# Patient Record
Sex: MALE | Race: WHITE | NOT HISPANIC OR LATINO | Employment: FULL TIME | URBAN - METROPOLITAN AREA
[De-identification: names, ages, dates, MRNs, and addresses within clinical notes are randomized per-mention and may not be internally consistent; named-entity substitution may affect disease eponyms.]

---

## 2017-03-24 ENCOUNTER — RECORDS - HEALTHEAST (OUTPATIENT)
Dept: LAB | Facility: CLINIC | Age: 27
End: 2017-03-24

## 2017-03-24 LAB
CHOLEST SERPL-MCNC: 121 MG/DL
FASTING STATUS PATIENT QL REPORTED: YES
HDLC SERPL-MCNC: 29 MG/DL
LDLC SERPL CALC-MCNC: 78 MG/DL
TRIGL SERPL-MCNC: 69 MG/DL

## 2018-02-19 ENCOUNTER — RECORDS - HEALTHEAST (OUTPATIENT)
Dept: LAB | Facility: CLINIC | Age: 28
End: 2018-02-19

## 2018-02-19 LAB
ALBUMIN SERPL-MCNC: 3.9 G/DL (ref 3.5–5)
ALP SERPL-CCNC: 88 U/L (ref 45–120)
ALT SERPL W P-5'-P-CCNC: 37 U/L (ref 0–45)
ANION GAP SERPL CALCULATED.3IONS-SCNC: 9 MMOL/L (ref 5–18)
AST SERPL W P-5'-P-CCNC: 21 U/L (ref 0–40)
BILIRUB SERPL-MCNC: 0.9 MG/DL (ref 0–1)
BUN SERPL-MCNC: 18 MG/DL (ref 8–22)
CALCIUM SERPL-MCNC: 9.2 MG/DL (ref 8.5–10.5)
CHLORIDE BLD-SCNC: 102 MMOL/L (ref 98–107)
CHOLEST SERPL-MCNC: 149 MG/DL
CO2 SERPL-SCNC: 27 MMOL/L (ref 22–31)
CREAT SERPL-MCNC: 0.88 MG/DL (ref 0.7–1.3)
FASTING STATUS PATIENT QL REPORTED: YES
GFR SERPL CREATININE-BSD FRML MDRD: >60 ML/MIN/1.73M2
GLUCOSE BLD-MCNC: 301 MG/DL (ref 70–125)
HDLC SERPL-MCNC: 28 MG/DL
LDLC SERPL CALC-MCNC: 95 MG/DL
POTASSIUM BLD-SCNC: 4.6 MMOL/L (ref 3.5–5)
PROT SERPL-MCNC: 6.7 G/DL (ref 6–8)
SODIUM SERPL-SCNC: 138 MMOL/L (ref 136–145)
TRIGL SERPL-MCNC: 130 MG/DL

## 2018-05-29 ENCOUNTER — RECORDS - HEALTHEAST (OUTPATIENT)
Dept: LAB | Facility: CLINIC | Age: 28
End: 2018-05-29

## 2018-05-29 LAB
ALBUMIN SERPL-MCNC: 3.9 G/DL (ref 3.5–5)
ALP SERPL-CCNC: 82 U/L (ref 45–120)
ALT SERPL W P-5'-P-CCNC: 32 U/L (ref 0–45)
ANION GAP SERPL CALCULATED.3IONS-SCNC: 10 MMOL/L (ref 5–18)
AST SERPL W P-5'-P-CCNC: 16 U/L (ref 0–40)
BILIRUB SERPL-MCNC: 0.6 MG/DL (ref 0–1)
BUN SERPL-MCNC: 13 MG/DL (ref 8–22)
CALCIUM SERPL-MCNC: 9.8 MG/DL (ref 8.5–10.5)
CHLORIDE BLD-SCNC: 105 MMOL/L (ref 98–107)
CHOLEST SERPL-MCNC: 144 MG/DL
CO2 SERPL-SCNC: 25 MMOL/L (ref 22–31)
CREAT SERPL-MCNC: 0.79 MG/DL (ref 0.7–1.3)
FASTING STATUS PATIENT QL REPORTED: YES
GFR SERPL CREATININE-BSD FRML MDRD: >60 ML/MIN/1.73M2
GLUCOSE BLD-MCNC: 197 MG/DL (ref 70–125)
HDLC SERPL-MCNC: 34 MG/DL
LDLC SERPL CALC-MCNC: 88 MG/DL
POTASSIUM BLD-SCNC: 4.5 MMOL/L (ref 3.5–5)
PROT SERPL-MCNC: 6.7 G/DL (ref 6–8)
SODIUM SERPL-SCNC: 140 MMOL/L (ref 136–145)
TRIGL SERPL-MCNC: 112 MG/DL

## 2019-03-05 ENCOUNTER — RECORDS - HEALTHEAST (OUTPATIENT)
Dept: LAB | Facility: CLINIC | Age: 29
End: 2019-03-05

## 2019-03-05 LAB
ALBUMIN SERPL-MCNC: 3.8 G/DL (ref 3.5–5)
ALP SERPL-CCNC: 81 U/L (ref 45–120)
ALT SERPL W P-5'-P-CCNC: 68 U/L (ref 0–45)
ANION GAP SERPL CALCULATED.3IONS-SCNC: 6 MMOL/L (ref 5–18)
AST SERPL W P-5'-P-CCNC: 41 U/L (ref 0–40)
BILIRUB SERPL-MCNC: 0.9 MG/DL (ref 0–1)
BUN SERPL-MCNC: 15 MG/DL (ref 8–22)
CALCIUM SERPL-MCNC: 9.5 MG/DL (ref 8.5–10.5)
CHLORIDE BLD-SCNC: 102 MMOL/L (ref 98–107)
CHOLEST SERPL-MCNC: 125 MG/DL
CO2 SERPL-SCNC: 29 MMOL/L (ref 22–31)
CREAT SERPL-MCNC: 0.91 MG/DL (ref 0.7–1.3)
FASTING STATUS PATIENT QL REPORTED: ABNORMAL
GFR SERPL CREATININE-BSD FRML MDRD: >60 ML/MIN/1.73M2
GLUCOSE BLD-MCNC: 357 MG/DL (ref 70–125)
HDLC SERPL-MCNC: 32 MG/DL
LDLC SERPL CALC-MCNC: 62 MG/DL
POTASSIUM BLD-SCNC: 4.6 MMOL/L (ref 3.5–5)
PROT SERPL-MCNC: 6.5 G/DL (ref 6–8)
SODIUM SERPL-SCNC: 137 MMOL/L (ref 136–145)
TRIGL SERPL-MCNC: 155 MG/DL

## 2019-10-21 ENCOUNTER — RECORDS - HEALTHEAST (OUTPATIENT)
Dept: LAB | Facility: CLINIC | Age: 29
End: 2019-10-21

## 2019-10-21 LAB
ALBUMIN SERPL-MCNC: 4.1 G/DL (ref 3.5–5)
ALP SERPL-CCNC: 74 U/L (ref 45–120)
ALT SERPL W P-5'-P-CCNC: 47 U/L (ref 0–45)
ANION GAP SERPL CALCULATED.3IONS-SCNC: 11 MMOL/L (ref 5–18)
AST SERPL W P-5'-P-CCNC: 20 U/L (ref 0–40)
BILIRUB SERPL-MCNC: 0.8 MG/DL (ref 0–1)
BUN SERPL-MCNC: 11 MG/DL (ref 8–22)
CALCIUM SERPL-MCNC: 9.8 MG/DL (ref 8.5–10.5)
CHLORIDE BLD-SCNC: 102 MMOL/L (ref 98–107)
CHOLEST SERPL-MCNC: 132 MG/DL
CO2 SERPL-SCNC: 26 MMOL/L (ref 22–31)
CREAT SERPL-MCNC: 0.81 MG/DL (ref 0.7–1.3)
FASTING STATUS PATIENT QL REPORTED: YES
GFR SERPL CREATININE-BSD FRML MDRD: >60 ML/MIN/1.73M2
GLUCOSE BLD-MCNC: 242 MG/DL (ref 70–125)
HDLC SERPL-MCNC: 33 MG/DL
LDLC SERPL CALC-MCNC: 72 MG/DL
POTASSIUM BLD-SCNC: 4.3 MMOL/L (ref 3.5–5)
PROT SERPL-MCNC: 6.7 G/DL (ref 6–8)
SODIUM SERPL-SCNC: 139 MMOL/L (ref 136–145)
TRIGL SERPL-MCNC: 136 MG/DL

## 2020-04-10 ENCOUNTER — RECORDS - HEALTHEAST (OUTPATIENT)
Dept: LAB | Facility: CLINIC | Age: 30
End: 2020-04-10

## 2020-04-10 LAB
ALBUMIN SERPL-MCNC: 4.1 G/DL (ref 3.5–5)
ALP SERPL-CCNC: 84 U/L (ref 45–120)
ALT SERPL W P-5'-P-CCNC: 37 U/L (ref 0–45)
ANION GAP SERPL CALCULATED.3IONS-SCNC: 9 MMOL/L (ref 5–18)
AST SERPL W P-5'-P-CCNC: 19 U/L (ref 0–40)
BILIRUB SERPL-MCNC: 0.4 MG/DL (ref 0–1)
BUN SERPL-MCNC: 12 MG/DL (ref 8–22)
CALCIUM SERPL-MCNC: 9.5 MG/DL (ref 8.5–10.5)
CHLORIDE BLD-SCNC: 102 MMOL/L (ref 98–107)
CO2 SERPL-SCNC: 27 MMOL/L (ref 22–31)
CREAT SERPL-MCNC: 0.88 MG/DL (ref 0.7–1.3)
GFR SERPL CREATININE-BSD FRML MDRD: >60 ML/MIN/1.73M2
GLUCOSE BLD-MCNC: 191 MG/DL (ref 70–125)
POTASSIUM BLD-SCNC: 4 MMOL/L (ref 3.5–5)
PROT SERPL-MCNC: 7.1 G/DL (ref 6–8)
SODIUM SERPL-SCNC: 138 MMOL/L (ref 136–145)

## 2020-11-16 ENCOUNTER — RECORDS - HEALTHEAST (OUTPATIENT)
Dept: LAB | Facility: CLINIC | Age: 30
End: 2020-11-16

## 2020-11-16 LAB
ALBUMIN SERPL-MCNC: 4.2 G/DL (ref 3.5–5)
ALP SERPL-CCNC: 84 U/L (ref 45–120)
ALT SERPL W P-5'-P-CCNC: 35 U/L (ref 0–45)
ANION GAP SERPL CALCULATED.3IONS-SCNC: 9 MMOL/L (ref 5–18)
AST SERPL W P-5'-P-CCNC: 26 U/L (ref 0–40)
BILIRUB SERPL-MCNC: 0.7 MG/DL (ref 0–1)
BUN SERPL-MCNC: 15 MG/DL (ref 8–22)
CALCIUM SERPL-MCNC: 9.1 MG/DL (ref 8.5–10.5)
CHLORIDE BLD-SCNC: 104 MMOL/L (ref 98–107)
CHOLEST SERPL-MCNC: 136 MG/DL
CO2 SERPL-SCNC: 25 MMOL/L (ref 22–31)
CREAT SERPL-MCNC: 0.88 MG/DL (ref 0.7–1.3)
CREAT UR-MCNC: 34.8 MG/DL
FASTING STATUS PATIENT QL REPORTED: NO
GFR SERPL CREATININE-BSD FRML MDRD: >60 ML/MIN/1.73M2
GLUCOSE BLD-MCNC: 410 MG/DL (ref 70–125)
HDLC SERPL-MCNC: 33 MG/DL
LDLC SERPL CALC-MCNC: 85 MG/DL
MICROALBUMIN UR-MCNC: <0.5 MG/DL (ref 0–1.99)
MICROALBUMIN/CREAT UR: NORMAL MG/G{CREAT}
POTASSIUM BLD-SCNC: 4.5 MMOL/L (ref 3.5–5)
PROT SERPL-MCNC: 6.7 G/DL (ref 6–8)
SODIUM SERPL-SCNC: 138 MMOL/L (ref 136–145)
TRIGL SERPL-MCNC: 89 MG/DL

## 2021-05-19 ENCOUNTER — AMBULATORY - HEALTHEAST (OUTPATIENT)
Dept: LAB | Facility: HOSPITAL | Age: 31
End: 2021-05-19

## 2021-05-19 ENCOUNTER — RECORDS - HEALTHEAST (OUTPATIENT)
Dept: ADMINISTRATIVE | Facility: OTHER | Age: 31
End: 2021-05-19

## 2021-05-19 DIAGNOSIS — F90.9 ATTENTION DEFICIT HYPERACTIVITY DISORDER: ICD-10-CM

## 2021-05-21 LAB
MISCELLANEOUS TEST DEPT. - HE HISTORICAL: NORMAL
PERFORMING LAB: NORMAL
SPECIMEN STATUS: NORMAL
TEST NAME: NORMAL

## 2021-07-26 ENCOUNTER — OFFICE VISIT (OUTPATIENT)
Dept: FAMILY MEDICINE | Facility: CLINIC | Age: 31
End: 2021-07-26
Payer: COMMERCIAL

## 2021-07-26 VITALS
SYSTOLIC BLOOD PRESSURE: 116 MMHG | HEART RATE: 82 BPM | TEMPERATURE: 96.9 F | HEIGHT: 72 IN | BODY MASS INDEX: 36.85 KG/M2 | DIASTOLIC BLOOD PRESSURE: 74 MMHG | WEIGHT: 272.1 LBS | OXYGEN SATURATION: 97 %

## 2021-07-26 DIAGNOSIS — F41.1 GAD (GENERALIZED ANXIETY DISORDER): ICD-10-CM

## 2021-07-26 DIAGNOSIS — Z86.59 HISTORY OF ATTENTION DEFICIT HYPERACTIVITY DISORDER (ADHD): Primary | ICD-10-CM

## 2021-07-26 PROBLEM — F90.9 ATTENTION DEFICIT HYPERACTIVITY DISORDER: Status: ACTIVE | Noted: 2021-07-26

## 2021-07-26 PROBLEM — B94.8 SEQUELAE OF OTHER SPECIFIED INFECTIOUS AND PARASITIC DISEASES: Status: ACTIVE | Noted: 2021-07-26

## 2021-07-26 PROBLEM — R43.2 LOSS OF TASTE: Status: ACTIVE | Noted: 2021-07-26

## 2021-07-26 PROBLEM — R43.0 ANOSMIA: Status: ACTIVE | Noted: 2021-07-26

## 2021-07-26 PROBLEM — E78.5 HYPERLIPIDEMIA: Status: ACTIVE | Noted: 2021-07-26

## 2021-07-26 PROBLEM — K21.9 GASTROESOPHAGEAL REFLUX DISEASE: Status: ACTIVE | Noted: 2021-07-26

## 2021-07-26 PROBLEM — J30.9 ALLERGIC RHINITIS: Status: ACTIVE | Noted: 2021-07-26

## 2021-07-26 PROBLEM — E11.9 DIABETES MELLITUS (H): Status: ACTIVE | Noted: 2021-07-26

## 2021-07-26 PROBLEM — E66.812 CLASS 2 OBESITY: Status: ACTIVE | Noted: 2021-07-26

## 2021-07-26 PROBLEM — J45.30 MILD PERSISTENT ASTHMA WITHOUT COMPLICATION: Status: ACTIVE | Noted: 2021-07-26

## 2021-07-26 PROCEDURE — 99204 OFFICE O/P NEW MOD 45 MIN: CPT | Performed by: FAMILY MEDICINE

## 2021-07-26 RX ORDER — GLIPIZIDE 5 MG/1
1 TABLET, FILM COATED, EXTENDED RELEASE ORAL DAILY
COMMUNITY
Start: 2021-07-19 | End: 2021-09-14

## 2021-07-26 RX ORDER — FLUOXETINE 10 MG/1
10 CAPSULE ORAL DAILY
Qty: 30 CAPSULE | Refills: 1 | Status: SHIPPED | OUTPATIENT
Start: 2021-07-26 | End: 2021-11-08

## 2021-07-26 RX ORDER — ALBUTEROL SULFATE 90 UG/1
2 POWDER, METERED RESPIRATORY (INHALATION) PRN
COMMUNITY
Start: 2021-04-19

## 2021-07-26 RX ORDER — EMPAGLIFLOZIN 25 MG/1
1 TABLET, FILM COATED ORAL DAILY
COMMUNITY
Start: 2021-06-13

## 2021-07-26 RX ORDER — SIMVASTATIN 20 MG
1 TABLET ORAL DAILY
COMMUNITY
Start: 2021-07-01

## 2021-07-26 RX ORDER — BUPROPION HYDROCHLORIDE 150 MG/1
150 TABLET ORAL EVERY MORNING
Qty: 30 TABLET | Refills: 1
Start: 2021-07-26

## 2021-07-26 RX ORDER — BUPROPION HYDROCHLORIDE 150 MG/1
300 TABLET ORAL DAILY
COMMUNITY
Start: 2021-06-13 | End: 2021-07-26

## 2021-07-26 ASSESSMENT — ANXIETY QUESTIONNAIRES
6. BECOMING EASILY ANNOYED OR IRRITABLE: MORE THAN HALF THE DAYS
3. WORRYING TOO MUCH ABOUT DIFFERENT THINGS: MORE THAN HALF THE DAYS
IF YOU CHECKED OFF ANY PROBLEMS ON THIS QUESTIONNAIRE, HOW DIFFICULT HAVE THESE PROBLEMS MADE IT FOR YOU TO DO YOUR WORK, TAKE CARE OF THINGS AT HOME, OR GET ALONG WITH OTHER PEOPLE: SOMEWHAT DIFFICULT
7. FEELING AFRAID AS IF SOMETHING AWFUL MIGHT HAPPEN: MORE THAN HALF THE DAYS
2. NOT BEING ABLE TO STOP OR CONTROL WORRYING: MORE THAN HALF THE DAYS
GAD7 TOTAL SCORE: 12
1. FEELING NERVOUS, ANXIOUS, OR ON EDGE: MORE THAN HALF THE DAYS
5. BEING SO RESTLESS THAT IT IS HARD TO SIT STILL: SEVERAL DAYS

## 2021-07-26 ASSESSMENT — PATIENT HEALTH QUESTIONNAIRE - PHQ9
SUM OF ALL RESPONSES TO PHQ QUESTIONS 1-9: 9
5. POOR APPETITE OR OVEREATING: SEVERAL DAYS

## 2021-07-26 ASSESSMENT — MIFFLIN-ST. JEOR: SCORE: 2242.24

## 2021-07-26 NOTE — PROGRESS NOTES
Assessment & Plan     History of attention deficit hyperactivity disorder (ADHD)  Patient has a history of ADHD as a child.  Complicating factors include a fair amount of anxiety and some relationship stressors in addition to a learning disability per patient.  Recommended formal evaluation with a psychologist to attempt to decipher what portion of his symptoms are related to ongoing ADHD.  Referral placed today.  - buPROPion (WELLBUTRIN XL) 150 MG 24 hr tablet; Take 1 tablet (150 mg) by mouth every morning  - MENTAL HEALTH REFERRAL  - Adult; Assessments and Testing; ADHD; [facility]: [facility] [phone]; We will contact you to schedule the appointment or please call with any questions; Future    MAREN (generalized anxiety disorder)  Patient appears to have a significant amount of anxiety, not a lot of depression.  He has never tried an SSRI for his symptoms.  Recommended tapering off Wellbutrin and onto an SSRI, will start with a low-dose of fluoxetine.  Plan recheck mood in 1 month.  - FLUoxetine (PROZAC) 10 MG capsule; Take 1 capsule (10 mg) by mouth daily  - MENTAL HEALTH REFERRAL  - Adult; Assessments and Testing; ADHD; G: [facility] [phone]; We will contact you to schedule the appointment or please call with any questions; Future    Prescription drug management  45 minutes spent on the date of the encounter doing chart review, history and exam, documentation and further activities per the note       BMI:   Estimated body mass index is 36.9 kg/m  as calculated from the following:    Height as of this encounter: 1.829 m (6').    Weight as of this encounter: 123.4 kg (272 lb 1.6 oz).       Return in about 4 weeks (around 8/23/2021) for Follow up mood and ADHD.    Miranda Cohcran  [facility] SYLVAIN Perez is a 30 year old who presents for the following health issues     HPI     Patient presents today primarily to discuss ADHD.  He  states that he was initially diagnosed at age 7 as a child through a formal assessment with a psychologist.  He was diagnosed with hyperactive ADHD, possibly also inattentive, but he is unsure about this.  He was treated with medication all the way through high school but felt that he did not need the medication anymore after graduating high school.  He works as an  for a city [employer] company.  His work hours are 4 PM to 12:30 AM.  He goes to bed between 2 and 3:00 in the morning, waking up between 11 AM and 12 PM.  He gives himself 8 hours to sleep.  The most recent medication that he took in high school was Concerta.  He thinks he took over 100 mg of Concerta along with another short acting medication as needed.  His current symptoms include putting off tasks for the last minute, trouble staying focused and on task.  He has noticed more bothersome symptoms over the past year.  His MAREN-7 score today is 12 with most prominent symptoms including excessive worry.  His PHQ-9 score is 7 with most prominent symptoms including trouble focusing and feeling jittery.  Patient also mentions that he is looking for a new primary care provider as his previous primary has left [facility], his previous clinic.  He states he was recently referred to a therapist for anxiety and has had one intake appointment.  This is through [facility].  He was told that this therapist does not do ADHD evaluations.    Review of Systems   Constitutional, HEENT, cardiovascular, pulmonary, gi and gu systems are negative, except as otherwise noted.      Objective    /74 (BP Location: Right arm, Patient Position: Sitting, Cuff Size: Adult Large)   Pulse 82   Temp 96.9  F (36.1  C) (Tympanic)   Ht 1.829 m (6')   Wt 123.4 kg (272 lb 1.6 oz)   SpO2 97%   BMI 36.90 kg/m    Body mass index is 36.9 kg/m .  Physical Exam   GENERAL: healthy, alert and no distress  RESP: Breathing is unlabored  CV: regular  rate and rhythm, normal S1 S2, no S3 or S4, no murmur, click or rub, no peripheral edema  MS: no gross musculoskeletal defects noted, no edema  NEURO: Normal strength and tone, mentation intact and speech normal  PSYCH: mentation appears normal, affect intense, mood described as anxious

## 2021-07-27 ASSESSMENT — ASTHMA QUESTIONNAIRES: ACT_TOTALSCORE: 25

## 2021-07-27 ASSESSMENT — ANXIETY QUESTIONNAIRES: GAD7 TOTAL SCORE: 12

## 2021-08-19 DIAGNOSIS — F41.1 GAD (GENERALIZED ANXIETY DISORDER): ICD-10-CM

## 2021-08-22 ENCOUNTER — HEALTH MAINTENANCE LETTER (OUTPATIENT)
Age: 31
End: 2021-08-22

## 2021-08-23 RX ORDER — FLUOXETINE 10 MG/1
CAPSULE ORAL
Qty: 30 CAPSULE | Refills: 1 | OUTPATIENT
Start: 2021-08-23

## 2021-08-30 ENCOUNTER — OFFICE VISIT (OUTPATIENT)
Dept: FAMILY MEDICINE | Facility: CLINIC | Age: 31
End: 2021-08-30
Payer: COMMERCIAL

## 2021-08-30 VITALS
WEIGHT: 270.38 LBS | BODY MASS INDEX: 36.67 KG/M2 | SYSTOLIC BLOOD PRESSURE: 124 MMHG | OXYGEN SATURATION: 97 % | DIASTOLIC BLOOD PRESSURE: 84 MMHG | HEART RATE: 81 BPM

## 2021-08-30 DIAGNOSIS — F41.1 GAD (GENERALIZED ANXIETY DISORDER): Primary | ICD-10-CM

## 2021-08-30 DIAGNOSIS — E66.01 MORBID OBESITY (H): ICD-10-CM

## 2021-08-30 DIAGNOSIS — E11.65 TYPE 2 DIABETES MELLITUS WITH HYPERGLYCEMIA, WITHOUT LONG-TERM CURRENT USE OF INSULIN (H): ICD-10-CM

## 2021-08-30 DIAGNOSIS — E78.5 HYPERLIPIDEMIA, UNSPECIFIED HYPERLIPIDEMIA TYPE: ICD-10-CM

## 2021-08-30 PROBLEM — E66.812 CLASS 2 OBESITY: Status: RESOLVED | Noted: 2021-07-26 | Resolved: 2021-08-30

## 2021-08-30 LAB
ALBUMIN SERPL-MCNC: 4 G/DL (ref 3.5–5)
ALP SERPL-CCNC: 77 U/L (ref 45–120)
ALT SERPL W P-5'-P-CCNC: 18 U/L (ref 0–45)
ANION GAP SERPL CALCULATED.3IONS-SCNC: 11 MMOL/L (ref 5–18)
AST SERPL W P-5'-P-CCNC: 13 U/L (ref 0–40)
BILIRUB SERPL-MCNC: 0.7 MG/DL (ref 0–1)
BUN SERPL-MCNC: 10 MG/DL (ref 8–22)
CALCIUM SERPL-MCNC: 9.9 MG/DL (ref 8.5–10.5)
CHLORIDE BLD-SCNC: 105 MMOL/L (ref 98–107)
CHOLEST SERPL-MCNC: 147 MG/DL
CO2 SERPL-SCNC: 25 MMOL/L (ref 22–31)
CREAT SERPL-MCNC: 0.78 MG/DL (ref 0.7–1.3)
FASTING STATUS PATIENT QL REPORTED: YES
GFR SERPL CREATININE-BSD FRML MDRD: >90 ML/MIN/1.73M2
GLUCOSE BLD-MCNC: 223 MG/DL (ref 70–125)
HBA1C MFR BLD: 10.1 % (ref 0–5.6)
HDLC SERPL-MCNC: 37 MG/DL
LDLC SERPL CALC-MCNC: 88 MG/DL
POTASSIUM BLD-SCNC: 4.5 MMOL/L (ref 3.5–5)
PROT SERPL-MCNC: 7.3 G/DL (ref 6–8)
SODIUM SERPL-SCNC: 141 MMOL/L (ref 136–145)
TRIGL SERPL-MCNC: 108 MG/DL
TSH SERPL DL<=0.005 MIU/L-ACNC: 0.77 UIU/ML (ref 0.3–5)

## 2021-08-30 PROCEDURE — 83036 HEMOGLOBIN GLYCOSYLATED A1C: CPT | Performed by: FAMILY MEDICINE

## 2021-08-30 PROCEDURE — 84443 ASSAY THYROID STIM HORMONE: CPT | Performed by: FAMILY MEDICINE

## 2021-08-30 PROCEDURE — 80061 LIPID PANEL: CPT | Performed by: FAMILY MEDICINE

## 2021-08-30 PROCEDURE — 36415 COLL VENOUS BLD VENIPUNCTURE: CPT | Performed by: FAMILY MEDICINE

## 2021-08-30 PROCEDURE — 80053 COMPREHEN METABOLIC PANEL: CPT | Performed by: FAMILY MEDICINE

## 2021-08-30 PROCEDURE — 99214 OFFICE O/P EST MOD 30 MIN: CPT | Performed by: FAMILY MEDICINE

## 2021-08-30 NOTE — PROGRESS NOTES
Assessment & Plan     MAREN (generalized anxiety disorder)  Labs updated as below and normal.  Patient continues to follow with a therapist and has a relationship with a psychiatrist as well.  His MAREN-7 score remains elevated, not currently taking Wellbutrin.  Asked him to discuss this with his psychiatric provider.  He has an upcoming appointment.  - TSH with free T4 reflex; Future  - Comprehensive metabolic panel (BMP + Alb, Alk Phos, ALT, AST, Total. Bili, TP); Future    Morbid obesity (H)  Labs updated as below and normal with the exception of greatly elevated blood sugars.  Comorbidities include type 2 diabetes and hyperlipidemia.  Discussed a healthy, vegetable rich diet and regular exercise.  Patient seems contemplative about lifestyle changes.  Restrictions include his work schedule.  - TSH with free T4 reflex; Future  - Lipid panel reflex to direct LDL Fasting; Future  - Comprehensive metabolic panel (BMP + Alb, Alk Phos, ALT, AST, Total. Bili, TP); Future    Type 2 diabetes mellitus without complication, without long-term current use of insulin (H)  A1c is greatly elevated, as is blood sugar.  He does not bring any blood sugar readings for review today.  I asked him to check morning fasting sugars and occasional 2-hour postmeal sugars and bring these to his next visit in 4 weeks.  Initially I would probably change him to a GLP-1 and drop his glipizide.  Ultimately he will probably need long-acting insulin as well.  - Hemoglobin A1c; Future  - TSH with free T4 reflex; Future  - Lipid panel reflex to direct LDL Fasting; Future  - Comprehensive metabolic panel (BMP + Alb, Alk Phos, ALT, AST, Total. Bili, TP); Future    Hyperlipidemia, unspecified hyperlipidemia type  Continues on simvastatin and tolerates this well.  Lipid profile within goal range.  - TSH with free T4 reflex; Future  - Lipid panel reflex to direct LDL Fasting; Future  - Comprehensive metabolic panel (BMP + Alb, Alk Phos, ALT, AST, Total.  ILAN Olvera); Future    Ordering of each unique test  Prescription drug management  32 minutes spent on the date of the encounter doing chart review, history and exam, documentation and further activities per the note       BMI:   Estimated body mass index is 36.67 kg/m  as calculated from the following:    Height as of 7/26/21: 1.829 m (6').    Weight as of this encounter: 122.6 kg (270 lb 6 oz).   Weight management plan: Discussed healthy diet and exercise guidelines        Return in about 4 weeks (around 9/27/2021) for Routine preventive.    Miranda Cochran MD  [facility]    Miri Perez is a 30 year old who presents for the following health issues     HPI     Patient presents today primarily in follow-up of anxiety and depression.  At his last visit, I got the impression that he wanted to change his primary care and also be tested for ADHD.  At that time, we adjusted some of his psychiatric medications and set him up for a formal assessment for ADHD.  He actually states that he has a therapist and a psychiatrist through [facility].  He claims that his psychiatrist asked him to come here for formal assessment for ADHD.  He has stopped taking his Wellbutrin for unclear reasons.  He states he has an appointment with his previous primary care provider next week for updated labs.  Try to clear some of his confusion for the patient today, he continues to express desire to change his primary care, so discussed that I can order his routine labs today.  I can also take over managing his diabetes medications.  He verbalizes understanding and will cancel his Entira visit.  Also discussed that to minimize confusion, we can follow through with a formal ADHD assessment, but I should not be managing his psychiatric medications if he has another psychiatric prescriber.  In terms of his diabetes, he does not bring a blood sugar readings to the visit today.  He notes that these are  quite high at home.  He thinks his diet is not as healthy as it should be.  He works an evening shift.  He has not been getting regular purposeful exercise.  He thinks it has been over 1 year since he met with diabetic educator.    Review of Systems   Constitutional, HEENT, cardiovascular, pulmonary, gi and gu systems are negative, except as otherwise noted.      Objective    /84 (BP Location: Right arm, Patient Position: Sitting, Cuff Size: Adult Regular)   Pulse 81   Wt 122.6 kg (270 lb 6 oz)   SpO2 97%   BMI 36.67 kg/m    Body mass index is 36.67 kg/m .  Physical Exam   GENERAL: healthy, alert and no distress  NECK: no adenopathy, no asymmetry, masses, or scars and thyroid normal to palpation  RESP: lungs clear to auscultation - no rales, rhonchi or wheezes  CV: regular rate and rhythm, normal S1 S2, no S3 or S4, no murmur, click or rub, no peripheral edema and peripheral pulses strong  ABDOMEN: soft, nontender, no hepatosplenomegaly, no masses and bowel sounds normal  MS: no gross musculoskeletal defects noted, no edema  NEURO: Normal strength and tone, mentation intact and speech normal  PSYCH: mentation appears normal, affect anxious    Results for orders placed or performed in visit on 08/30/21   Hemoglobin A1c     Status: Abnormal   Result Value Ref Range    Hemoglobin A1C 10.1 (H) 0.0 - 5.6 %   TSH with free T4 reflex     Status: Normal   Result Value Ref Range    TSH 0.77 0.30 - 5.00 uIU/mL   Lipid panel reflex to direct LDL Fasting     Status: Abnormal   Result Value Ref Range    Cholesterol 147 <=199 mg/dL    Triglycerides 108 <=149 mg/dL    Direct Measure HDL 37 (L) >=40 mg/dL    LDL Cholesterol Calculated 88 <=129 mg/dL    Patient Fasting > 8hrs? Yes    Comprehensive metabolic panel (BMP + Alb, Alk Phos, ALT, AST, Total. Bili, TP)     Status: Abnormal   Result Value Ref Range    Sodium 141 136 - 145 mmol/L    Potassium 4.5 3.5 - 5.0 mmol/L    Chloride 105 98 - 107 mmol/L    Carbon Dioxide  (CO2) 25 22 - 31 mmol/L    Anion Gap 11 5 - 18 mmol/L    Urea Nitrogen 10 8 - 22 mg/dL    Creatinine 0.78 0.70 - 1.30 mg/dL    Calcium 9.9 8.5 - 10.5 mg/dL    Glucose 223 (H) 70 - 125 mg/dL    Alkaline Phosphatase 77 45 - 120 U/L    AST 13 0 - 40 U/L    ALT 18 0 - 45 U/L    Protein Total 7.3 6.0 - 8.0 g/dL    Albumin 4.0 3.5 - 5.0 g/dL    Bilirubin Total 0.7 0.0 - 1.0 mg/dL    GFR Estimate >90 >60 mL/min/1.73m2

## 2021-08-30 NOTE — PATIENT INSTRUCTIONS
I would keep your connection with your psychiatrist.  Make a follow-up appointment if you do not already have one.    Keep the appointment for formal ADHD testing.    We will update diabetes labs today, cancel your upcoming appointment with Dante.  Let's see you back in about 1 month for a complete physical exam.

## 2021-09-01 ASSESSMENT — PATIENT HEALTH QUESTIONNAIRE - PHQ9
SUM OF ALL RESPONSES TO PHQ QUESTIONS 1-9: 4
5. POOR APPETITE OR OVEREATING: SEVERAL DAYS

## 2021-09-01 ASSESSMENT — ANXIETY QUESTIONNAIRES
5. BEING SO RESTLESS THAT IT IS HARD TO SIT STILL: SEVERAL DAYS
2. NOT BEING ABLE TO STOP OR CONTROL WORRYING: MORE THAN HALF THE DAYS
IF YOU CHECKED OFF ANY PROBLEMS ON THIS QUESTIONNAIRE, HOW DIFFICULT HAVE THESE PROBLEMS MADE IT FOR YOU TO DO YOUR WORK, TAKE CARE OF THINGS AT HOME, OR GET ALONG WITH OTHER PEOPLE: SOMEWHAT DIFFICULT
3. WORRYING TOO MUCH ABOUT DIFFERENT THINGS: MORE THAN HALF THE DAYS
1. FEELING NERVOUS, ANXIOUS, OR ON EDGE: SEVERAL DAYS
GAD7 TOTAL SCORE: 10
7. FEELING AFRAID AS IF SOMETHING AWFUL MIGHT HAPPEN: SEVERAL DAYS
6. BECOMING EASILY ANNOYED OR IRRITABLE: MORE THAN HALF THE DAYS

## 2021-09-02 ASSESSMENT — ANXIETY QUESTIONNAIRES: GAD7 TOTAL SCORE: 10

## 2021-09-14 ENCOUNTER — OFFICE VISIT (OUTPATIENT)
Dept: EDUCATION SERVICES | Facility: CLINIC | Age: 31
End: 2021-09-14
Attending: FAMILY MEDICINE
Payer: COMMERCIAL

## 2021-09-14 VITALS — WEIGHT: 279.1 LBS | BODY MASS INDEX: 37.85 KG/M2

## 2021-09-14 DIAGNOSIS — E11.65 TYPE 2 DIABETES MELLITUS WITH HYPERGLYCEMIA, WITHOUT LONG-TERM CURRENT USE OF INSULIN (H): ICD-10-CM

## 2021-09-14 PROCEDURE — G0108 DIAB MANAGE TRN  PER INDIV: HCPCS | Mod: AE

## 2021-09-14 RX ORDER — GLIPIZIDE 5 MG/1
10 TABLET, FILM COATED, EXTENDED RELEASE ORAL DAILY
Qty: 180 TABLET | Refills: 1 | Status: SHIPPED | OUTPATIENT
Start: 2021-09-14

## 2021-09-14 NOTE — LETTER
9/14/2021         RE: Chris Astorga  [address]  [place] [address]        Dear Colleague,    Thank you for referring your patient, Chris Astorga, to the [facility]. Please see a copy of my visit note below.    Diabetes Self-Management Education & Support    Presents for: Individual review    Type of Visit: In Person    How would patient like to obtain AVS?  AVS was provided to patient at conclusion of today's visit.    ASSESSMENT:    Chris (Alberto) is a 31-year-old who presents today for consult regarding his diabetes management and self diabetes management skills with an A1c not at ADA goal of less than 7%.  He arrived today unaccompanied and did have his meter with him.  Per review of his blood glucose he is sometimes checking his blood glucose twice daily but this is not consistent.  He is currently on 3 medications for his diabetes management and per his report he may forget to take them or skipped doses 2-3 times each week.  This is because he simply forgets or does not have his pillbox available to him.  Alberto was originally diagnosed with diabetes in 2017 at which time he attended diabetes education classes through the [facility].  He met with the educator again in 2018 but has not followed up since.  He is currently employed as an  in his hours at 4 PM to 12:30 AM.    Patient's most recent   Lab Results   Component Value Date    A1C 10.1 08/30/2021    is not meeting goal of <7.0    Diabetes knowledge and skills assessment:   Patient is knowledgeable in diabetes management concepts related to: None    Continue education with the following diabetes management concepts: Healthy Eating, Being Active, Monitoring, Taking Medication, Problem Solving, Reducing Risks and Healthy Coping    Based on learning assessment above, most appropriate setting for further diabetes education would be: Individual setting.    INTERVENTIONS:    Education  provided today on:  AADE Self-Care Behaviors:  Diabetes Pathophysiology  Healthy Eating: consistency in amount, composition, and timing of food intake, weight reduction, eating out and portion control  Being Active: relationship to blood glucose and describe appropriate activity program  Monitoring: purpose, individual blood glucose targets, frequency of monitoring and use of CGM.  Chris expressed a great deal of interest in personal CGM's today-he was ultimately provided with a sample kory 2 and reader to use for the next 14 days.  I am hoping that in using this and being able to see real-time blood glucose in relation to food intake and activity may be the motivating factor to getting him engaged with his management  Taking Medication: action of prescribed medication and when to take medications  Problem Solving: high blood glucose - causes, signs/symptoms, treatment and prevention, safe travel and when to call health care provider  Reducing Risks: major complications of diabetes, prevention, early diagnostic measures and treatment of complications, foot care and A1C - goals, relating to blood glucose levels, how often to check  Patient was instructed on freestyle kory 2 CGM    Opportunities for ongoing education and support in diabetes-self management were discussed. Pt verbalized understanding of concepts discussed and recommendations provided today.       Education Materials Provided:  Freestyle kory 2 meter kit      Goals Addressed as of 9/14/2021 at 12:42 PM        Monitoring (pt-stated)     Added 9/14/21 by Renita Verdugo, RN      I will monitor my blood glucose at least 3 times each day using the CGM that was provided a sample during today's visit 9/14/2021           Reduce sugar intake per day     Added 9/14/21 by Renita Verdugo, JULIAN      Substitute non sugary items such as peanut butter or cheese sticks for sugary items (candy) when snacking 9/14/2021            PLAN    Monitoring: Monitor blood  glucose several times each day using the [employer] reader that was provided to you today.  Bring reader to follow-up appointment in October to review BG data.  Nutrition: Work on eating 3 balanced meals each day and monitor carbohydrates and portions.  Work on snacking and food choices-try to apply the suggestions and recommendations discussed today.  Medications: Continue metformin and Jardiance as prescribed, increase glipizide XL to 10 mg daily.  After CGM trial is completed and blood glucose patterns and trends are assessed and discussed but ultimately like to see patient started GLP-1 agonist.  Activity: Goal is 30 minutes activity/exercise at a moderate level each day.  Alberto was instructed to call with any questions or concerns or My Chart message me that might come up.  Topics to cover at upcoming visits: Healthy Eating, Being Active, Monitoring, Taking Medication and Problem Solving  Follow-up: 10/5/2021    See Goals Section for co-developed, patient-stated behavior change goals.  AVS provided to patient today.          SUBJECTIVE / OBJECTIVE:  Presents for: Individual review  Accompanied by: Self  Diabetes education in the past 24 mo: No  Focus of Visit: Assistance w/ making life changes, Healthy Eating, Healthy Coping, Taking Medication, Reducing Risks, Problem Solving, Self-care behavioral goal setting  Diabetes type: Type 2  Date of diagnosis: 2017  Disease course: Worsening  Transportation concerns: No  Difficulty affording diabetes medication?: No  Difficulty affording diabetes testing supplies?: Sometimes  Other concerns:: None  Cultural Influences/Ethnic Background:  Not  or       Diabetes Symptoms & Complications:  Fatigue: Yes  Polydipsia: Yes  Polyphagia: Yes  Polyuria: Yes  Slow healing wounds: Yes  Symptom course: Worsening  Complications assessed today?: Yes  Heart disease: Yes  Nephropathy: Yes  Retinopathy: Yes    Patient Problem List and Family Medical History reviewed for relevant  medical history, current medical status, and diabetes risk factors.    Vitals:  Wt 126.6 kg (279 lb 1.6 oz)   BMI 37.85 kg/m    Estimated body mass index is 37.85 kg/m  as calculated from the following:    Height as of 7/26/21: 1.829 m (6').    Weight as of this encounter: 126.6 kg (279 lb 1.6 oz).   Last 3 BP:   BP Readings from Last 3 Encounters:   08/30/21 124/84   07/26/21 116/74       History   Smoking Status     Never Smoker   Smokeless Tobacco     Never Used       Labs:  Lab Results   Component Value Date    A1C 10.1 08/30/2021     Lab Results   Component Value Date     08/30/2021     Lab Results   Component Value Date    LDL 88 08/30/2021     Direct Measure HDL   Date Value Ref Range Status   08/30/2021 37 (L) >=40 mg/dL Final     Comment:     HDL Cholesterol Reference Range:     0-2 years:   No reference ranges established for patients under 2 years old  at [facility] for lipid analytes.    2-8 years:  Greater than 45 mg/dL     18 years and older:   Female: Greater than or equal to 50 mg/dL   Male:   Greater than or equal to 40 mg/dL   ]  GFR Estimate   Date Value Ref Range Status   08/30/2021 >90 >60 mL/min/1.73m2 Final     Comment:     As of July 11, 2021, eGFR is calculated by the CKD-EPI creatinine equation, without race adjustment. eGFR can be influenced by muscle mass, exercise, and diet. The reported eGFR is an estimation only and is only applicable if the renal function is stable.   11/16/2020 >60 >60 mL/min/1.73m2 Final     GFR Estimate If Black   Date Value Ref Range Status   11/16/2020 >60 >60 mL/min/1.73m2 Final     Lab Results   Component Value Date    CR 0.78 08/30/2021     No results found for: MICROALBUMIN    Healthy Eating:  Healthy Eating Assessed Today: Yes  Cultural/Hinduism diet restrictions?: No  Meal planning/habits: None  How many times a week on average do you eat food made away from home (restaurant/take-out)?: 5+  Meals include: Breakfast, Lunch, Dinner,  Evening Snack, Afternoon Snack  Breakfast: 20 piece [employer] chicken Mc Nuggets and 3 barbecue sauces/diet Coke     total CHO =96 g  Lunch: Burger with fries or Gyro with fries /diet Mountain Dew  Dinner: Taco Bell or hot dog or grilled cheese sandwich  Snacks: Chips (cheese puff corn, cheetos..) Candy: Skittles, fruit snacks, fruit roll ups   frequently will wake up NOC to urinate and eat candy at this time  Other: No alcohol  Beverages: Water, Diet soda, Other (SHIMON, Gatorade 0)  Has patient met with a dietitian in the past?: Yes (At [facility] there is a RD)    Being Active:  Being Active Assessed Today: Yes  Exercise:: Currently not exercising  Barrier to exercise: None    Monitoring:  Monitoring Assessed Today: Yes  Did patient bring glucose meter to appointment? : Yes  Blood Glucose Meter: One Touch  Times checking blood sugar at home (number): 2 (This is not consistent)  Times checking blood sugar at home (per): Day  Blood glucose trend: Fluctuating    Glucose data:  BG data taken from meter was reviewed and discussed today the following are the most recent readings in Alberto's meter   Date Breakfast  Lunch  Dinner  Bedtime    Before After Before After Before After    9/14  231         9/13  284   360      9/10 194   243      9/9   290 304      9/6 253   199      9/2   260       8/31 187             Taking Medications:  Diabetes Medication(s)     Biguanides       metFORMIN (GLUCOPHAGE) 1000 MG tablet    Take 2,000 mg by mouth daily (with breakfast)    Sodium-Glucose Co-Transporter 2 (SGLT2) Inhibitors       JARDIANCE 25 MG TABS tablet    Take 1 tablet by mouth daily    Sulfonylureas       glipiZIDE (GLUCOTROL XL) 5 MG 24 hr tablet    Take 2 tablets (10 mg) by mouth daily          Taking Medication Assessed Today: Yes  Current Treatments: Oral Medication (taken by mouth)  Problems taking diabetes medications regularly?: Yes (May forget 2-3 times each week.  He does have a pill cavity but may forget it at  work and then not have it available on the weekends)  Diabetes medication side effects?: No    Problem Solving:  Problem Solving Assessed Today: Yes  Is the patient at risk for hypoglycemia?: No              Reducing Risks:  Reducing Risks Assessed Today: Yes  Diabetes Risks: Hyperlipidemia  CAD Risks: Diabetes Mellitus, Male sex, Obesity  Feet checked by healthcare provider in the last year?: Yes    Healthy Coping:  Healthy Coping Assessed Today: Yes  Emotional response to diabetes: Uncertain  Stage of change: CONTEMPLATION (Considering change and yet undecided)  Support resources: In-person Offerings  Patient Activation Measure Survey Score:  No flowsheet data found.          Thank you,  Renita Beck RN CDCES  Certified Diabetes Care and   Visit type : DSMT      Time Spent: 60 minutes  Encounter Type: Individual        Any diabetes medication dose changes were made via the Certified Diabetes Care & Education Protocol in collaboration with the patient's primary care provider. A copy of this encounter was shared with the provider.

## 2021-09-14 NOTE — Clinical Note
Please call with any questions and/or concerns  Thank You,  Renita Beck RN, BSN [facility]  Certified Diabetes Care and   [phone]

## 2021-09-14 NOTE — PATIENT INSTRUCTIONS
1. Eat 3 balanced meals each day - Monitor carb intake and limit to 60-75 grams per meal  This would be equal to 4-5 choices ~  1 choice = 15 grams    Do not wait longer than 4-5 hours to eat something  Snacks limit to no more than 30 grams of carbohydrates or 2 choices  Alternate snack ideas - get some peanut butter , cheese, sugar free pudding - chew sugar free gum to help with sweet cravings  Make sure you include protein source with each meal and at bedtime - this has been shown to help with blood glucose elevations    2. Check blood sugars 2-3  times each day  - when you wake up  - before dinner  - 2 hours AFTER dinner   Fasting and before meal target is 80 - 130   2 hours after a meal target is < 180  remember to bring meter and log book to all appointments    3. Incorporate 30 minutes activity into each day - does not need to be all at one time & walking counts    4. Take diabetes medications as prescribed continue Metformin 2000 mg each day, increase Glipizide to 2 tablets once each day, and keep Jardiance 25 mg each day     Call insurance to see if Adelina continuous glucose monitor is covered     Have fun with the sensor and see you in October

## 2021-09-14 NOTE — PROGRESS NOTES
Diabetes Self-Management Education & Support    Presents for: Individual review    Type of Visit: In Person    How would patient like to obtain AVS?  AVS was provided to patient at conclusion of today's visit.    ASSESSMENT:    Chris Fu) is a 31-year-old who presents today for consult regarding his diabetes management and self diabetes management skills with an A1c not at ADA goal of less than 7%.  He arrived today unaccompanied and did have his meter with him.  Per review of his blood glucose he is sometimes checking his blood glucose twice daily but this is not consistent.  He is currently on 3 medications for his diabetes management and per his report he may forget to take them or skipped doses 2-3 times each week.  This is because he simply forgets or does not have his pillbox available to him.  Alberto was originally diagnosed with diabetes in 2017 at which time he attended diabetes education classes through the \[facility]\"" clinic.  He met with the educator again in 2018 but has not followed up since.  He is currently employed as an  in his hours at 4 PM to 12:30 AM.    Patient's most recent   Lab Results   Component Value Date    A1C 10.1 08/30/2021    is not meeting goal of <7.0    Diabetes knowledge and skills assessment:   Patient is knowledgeable in diabetes management concepts related to: None    Continue education with the following diabetes management concepts: Healthy Eating, Being Active, Monitoring, Taking Medication, Problem Solving, Reducing Risks and Healthy Coping    Based on learning assessment above, most appropriate setting for further diabetes education would be: Individual setting.    INTERVENTIONS:    Education provided today on:  AADE Self-Care Behaviors:  Diabetes Pathophysiology  Healthy Eating: consistency in amount, composition, and timing of food intake, weight reduction, eating out and portion control  Being Active: relationship to blood glucose and describe  appropriate activity program  Monitoring: purpose, individual blood glucose targets, frequency of monitoring and use of CGM.  Chris expressed a great deal of interest in personal CGM's today-he was ultimately provided with a sample kory 2 and reader to use for the next 14 days.  I am hoping that in using this and being able to see real-time blood glucose in relation to food intake and activity may be the motivating factor to getting him engaged with his management  Taking Medication: action of prescribed medication and when to take medications  Problem Solving: high blood glucose - causes, signs/symptoms, treatment and prevention, safe travel and when to call health care provider  Reducing Risks: major complications of diabetes, prevention, early diagnostic measures and treatment of complications, foot care and A1C - goals, relating to blood glucose levels, how often to check  Patient was instructed on freestyle kory 2 CGM    Opportunities for ongoing education and support in diabetes-self management were discussed. Pt verbalized understanding of concepts discussed and recommendations provided today.       Education Materials Provided:  Freestyle kory 2 meter kit      Goals Addressed as of 9/14/2021 at 12:42 PM        Monitoring (pt-stated)     Added 9/14/21 by Renita Verdugo, RN      I will monitor my blood glucose at least 3 times each day using the CGM that was provided a sample during today's visit 9/14/2021           Reduce sugar intake per day     Added 9/14/21 by Renita Verdugo, RN      Substitute non sugary items such as peanut butter or cheese sticks for sugary items (candy) when snacking 9/14/2021            PLAN    Monitoring: Monitor blood glucose several times each day using the kory reader that was provided to you today.  Bring reader to follow-up appointment in October to review BG data.  Nutrition: Work on eating 3 balanced meals each day and monitor carbohydrates and portions.  Work on  snacking and food choices-try to apply the suggestions and recommendations discussed today.  Medications: Continue metformin and Jardiance as prescribed, increase glipizide XL to 10 mg daily.  After CGM trial is completed and blood glucose patterns and trends are assessed and discussed but ultimately like to see patient started GLP-1 agonist.  Activity: Goal is 30 minutes activity/exercise at a moderate level each day.  Alberto was instructed to call with any questions or concerns or My Chart message me that might come up.  Topics to cover at upcoming visits: Healthy Eating, Being Active, Monitoring, Taking Medication and Problem Solving  Follow-up: 10/5/2021    See Goals Section for co-developed, patient-stated behavior change goals.  AVS provided to patient today.          SUBJECTIVE / OBJECTIVE:  Presents for: Individual review  Accompanied by: Self  Diabetes education in the past 24 mo: No  Focus of Visit: Assistance w/ making life changes, Healthy Eating, Healthy Coping, Taking Medication, Reducing Risks, Problem Solving, Self-care behavioral goal setting  Diabetes type: Type 2  Date of diagnosis: 2017  Disease course: Worsening  Transportation concerns: No  Difficulty affording diabetes medication?: No  Difficulty affording diabetes testing supplies?: Sometimes  Other concerns:: None  Cultural Influences/Ethnic Background:  Not  or       Diabetes Symptoms & Complications:  Fatigue: Yes  Polydipsia: Yes  Polyphagia: Yes  Polyuria: Yes  Slow healing wounds: Yes  Symptom course: Worsening  Complications assessed today?: Yes  Heart disease: Yes  Nephropathy: Yes  Retinopathy: Yes    Patient Problem List and Family Medical History reviewed for relevant medical history, current medical status, and diabetes risk factors.    Vitals:  Wt 126.6 kg (279 lb 1.6 oz)   BMI 37.85 kg/m    Estimated body mass index is 37.85 kg/m  as calculated from the following:    Height as of 7/26/21: 1.829 m (6').    Weight as  of this encounter: 126.6 kg (279 lb 1.6 oz).   Last 3 BP:   BP Readings from Last 3 Encounters:   08/30/21 124/84   07/26/21 116/74       History   Smoking Status     Never Smoker   Smokeless Tobacco     Never Used       Labs:  Lab Results   Component Value Date    A1C 10.1 08/30/2021     Lab Results   Component Value Date     08/30/2021     Lab Results   Component Value Date    LDL 88 08/30/2021     Direct Measure HDL   Date Value Ref Range Status   08/30/2021 37 (L) >=40 mg/dL Final     Comment:     HDL Cholesterol Reference Range:     0-2 years:   No reference ranges established for patients under 2 years old  at [facility] for lipid analytes.    2-8 years:  Greater than 45 mg/dL     18 years and older:   Female: Greater than or equal to 50 mg/dL   Male:   Greater than or equal to 40 mg/dL   ]  GFR Estimate   Date Value Ref Range Status   08/30/2021 >90 >60 mL/min/1.73m2 Final     Comment:     As of July 11, 2021, eGFR is calculated by the CKD-EPI creatinine equation, without race adjustment. eGFR can be influenced by muscle mass, exercise, and diet. The reported eGFR is an estimation only and is only applicable if the renal function is stable.   11/16/2020 >60 >60 mL/min/1.73m2 Final     GFR Estimate If Black   Date Value Ref Range Status   11/16/2020 >60 >60 mL/min/1.73m2 Final     Lab Results   Component Value Date    CR 0.78 08/30/2021     No results found for: MICROALBUMIN    Healthy Eating:  Healthy Eating Assessed Today: Yes  Cultural/Christian diet restrictions?: No  Meal planning/habits: None  How many times a week on average do you eat food made away from home (restaurant/take-out)?: 5+  Meals include: Breakfast, Lunch, Dinner, Evening Snack, Afternoon Snack  Breakfast: 20 piece [employer] chicken Mc Nuggets and 3 barbecue sauces/diet Coke     total CHO =96 g  Lunch: Burger with fries or Gyro with fries /diet Mountain Dew  Dinner: Taco [employer] or hot dog or grilled cheese  sandwich  Snacks: Chips (cheese puff corn, cheetos..) Candy: Skittles, fruit snacks, fruit roll ups   frequently will wake up NOC to urinate and eat candy at this time  Other: No alcohol  Beverages: Water, Diet soda, Other (SHIMON, Gatorade 0)  Has patient met with a dietitian in the past?: Yes (At [facility] there is a RD)    Being Active:  Being Active Assessed Today: Yes  Exercise:: Currently not exercising  Barrier to exercise: None    Monitoring:  Monitoring Assessed Today: Yes  Did patient bring glucose meter to appointment? : Yes  Blood Glucose Meter: One Touch  Times checking blood sugar at home (number): 2 (This is not consistent)  Times checking blood sugar at home (per): Day  Blood glucose trend: Fluctuating    Glucose data:  BG data taken from meter was reviewed and discussed today the following are the most recent readings in Alberto's meter   Date Breakfast  Lunch  Dinner  Bedtime    Before After Before After Before After    9/14  231         9/13  284   360      9/10 194   243      9/9   290 304      9/6 253   199      9/2   260       8/31 187             Taking Medications:  Diabetes Medication(s)     Biguanides       metFORMIN (GLUCOPHAGE) 1000 MG tablet    Take 2,000 mg by mouth daily (with breakfast)    Sodium-Glucose Co-Transporter 2 (SGLT2) Inhibitors       JARDIANCE 25 MG TABS tablet    Take 1 tablet by mouth daily    Sulfonylureas       glipiZIDE (GLUCOTROL XL) 5 MG 24 hr tablet    Take 2 tablets (10 mg) by mouth daily          Taking Medication Assessed Today: Yes  Current Treatments: Oral Medication (taken by mouth)  Problems taking diabetes medications regularly?: Yes (May forget 2-3 times each week.  He does have a pill cavity but may forget it at work and then not have it available on the weekends)  Diabetes medication side effects?: No    Problem Solving:  Problem Solving Assessed Today: Yes  Is the patient at risk for hypoglycemia?: No              Reducing Risks:  Reducing Risks  Assessed Today: Yes  Diabetes Risks: Hyperlipidemia  CAD Risks: Diabetes Mellitus, Male sex, Obesity  Feet checked by healthcare provider in the last year?: Yes    Healthy Coping:  Healthy Coping Assessed Today: Yes  Emotional response to diabetes: Uncertain  Stage of change: CONTEMPLATION (Considering change and yet undecided)  Support resources: In-person Offerings  Patient Activation Measure Survey Score:  No flowsheet data found.          Thank you,  Renita Beck RN CDCES  Certified Diabetes Care and   Visit type : DSMT      Time Spent: 60 minutes  Encounter Type: Individual        Any diabetes medication dose changes were made via the Certified Diabetes Care & Education Protocol in collaboration with the patient's primary care provider. A copy of this encounter was shared with the provider.

## 2021-09-29 ENCOUNTER — OFFICE VISIT (OUTPATIENT)
Dept: FAMILY MEDICINE | Facility: CLINIC | Age: 31
End: 2021-09-29
Payer: COMMERCIAL

## 2021-09-29 VITALS
HEART RATE: 80 BPM | WEIGHT: 276.3 LBS | DIASTOLIC BLOOD PRESSURE: 84 MMHG | SYSTOLIC BLOOD PRESSURE: 130 MMHG | BODY MASS INDEX: 34.35 KG/M2 | HEIGHT: 75 IN

## 2021-09-29 DIAGNOSIS — E11.65 TYPE 2 DIABETES MELLITUS WITH HYPERGLYCEMIA, WITHOUT LONG-TERM CURRENT USE OF INSULIN (H): ICD-10-CM

## 2021-09-29 DIAGNOSIS — E78.5 HYPERLIPIDEMIA, UNSPECIFIED HYPERLIPIDEMIA TYPE: ICD-10-CM

## 2021-09-29 DIAGNOSIS — F41.1 GAD (GENERALIZED ANXIETY DISORDER): ICD-10-CM

## 2021-09-29 DIAGNOSIS — F90.2 ATTENTION DEFICIT HYPERACTIVITY DISORDER (ADHD), COMBINED TYPE: ICD-10-CM

## 2021-09-29 DIAGNOSIS — J45.30 MILD PERSISTENT ASTHMA WITHOUT COMPLICATION: ICD-10-CM

## 2021-09-29 DIAGNOSIS — Z00.00 ROUTINE GENERAL MEDICAL EXAMINATION AT A HEALTH CARE FACILITY: Primary | ICD-10-CM

## 2021-09-29 DIAGNOSIS — H65.01 RIGHT ACUTE SEROUS OTITIS MEDIA, RECURRENCE NOT SPECIFIED: ICD-10-CM

## 2021-09-29 PROCEDURE — 99395 PREV VISIT EST AGE 18-39: CPT | Mod: 25 | Performed by: FAMILY MEDICINE

## 2021-09-29 PROCEDURE — 96127 BRIEF EMOTIONAL/BEHAV ASSMT: CPT | Performed by: FAMILY MEDICINE

## 2021-09-29 PROCEDURE — 90686 IIV4 VACC NO PRSV 0.5 ML IM: CPT | Performed by: FAMILY MEDICINE

## 2021-09-29 PROCEDURE — 90471 IMMUNIZATION ADMIN: CPT | Performed by: FAMILY MEDICINE

## 2021-09-29 RX ORDER — FLASH GLUCOSE SENSOR
1 KIT MISCELLANEOUS
Qty: 2 EACH | Refills: 5 | Status: SHIPPED | OUTPATIENT
Start: 2021-09-29 | End: 2021-11-23 | Stop reason: ALTCHOICE

## 2021-09-29 ASSESSMENT — ANXIETY QUESTIONNAIRES
4. TROUBLE RELAXING: SEVERAL DAYS
6. BECOMING EASILY ANNOYED OR IRRITABLE: SEVERAL DAYS
2. NOT BEING ABLE TO STOP OR CONTROL WORRYING: NOT AT ALL
5. BEING SO RESTLESS THAT IT IS HARD TO SIT STILL: NOT AT ALL
IF YOU CHECKED OFF ANY PROBLEMS ON THIS QUESTIONNAIRE, HOW DIFFICULT HAVE THESE PROBLEMS MADE IT FOR YOU TO DO YOUR WORK, TAKE CARE OF THINGS AT HOME, OR GET ALONG WITH OTHER PEOPLE: NOT DIFFICULT AT ALL
1. FEELING NERVOUS, ANXIOUS, OR ON EDGE: NOT AT ALL
7. FEELING AFRAID AS IF SOMETHING AWFUL MIGHT HAPPEN: NOT AT ALL
GAD7 TOTAL SCORE: 2
3. WORRYING TOO MUCH ABOUT DIFFERENT THINGS: NOT AT ALL

## 2021-09-29 ASSESSMENT — ASTHMA QUESTIONNAIRES
ACT_TOTALSCORE: 25
QUESTION_1 LAST FOUR WEEKS HOW MUCH OF THE TIME DID YOUR ASTHMA KEEP YOU FROM GETTING AS MUCH DONE AT WORK, SCHOOL OR AT HOME: NONE OF THE TIME
QUESTION_5 LAST FOUR WEEKS HOW WOULD YOU RATE YOUR ASTHMA CONTROL: COMPLETELY CONTROLLED
QUESTION_2 LAST FOUR WEEKS HOW OFTEN HAVE YOU HAD SHORTNESS OF BREATH: NOT AT ALL
QUESTION_4 LAST FOUR WEEKS HOW OFTEN HAVE YOU USED YOUR RESCUE INHALER OR NEBULIZER MEDICATION (SUCH AS ALBUTEROL): NOT AT ALL
QUESTION_3 LAST FOUR WEEKS HOW OFTEN DID YOUR ASTHMA SYMPTOMS (WHEEZING, COUGHING, SHORTNESS OF BREATH, CHEST TIGHTNESS OR PAIN) WAKE YOU UP AT NIGHT OR EARLIER THAN USUAL IN THE MORNING: NOT AT ALL

## 2021-09-29 ASSESSMENT — PATIENT HEALTH QUESTIONNAIRE - PHQ9: SUM OF ALL RESPONSES TO PHQ QUESTIONS 1-9: 2

## 2021-09-29 ASSESSMENT — MIFFLIN-ST. JEOR: SCORE: 2299.95

## 2021-09-29 NOTE — PROGRESS NOTES
SUBJECTIVE:   CC: Chris Astorga is an 31 year old male who presents for preventative health visit.     Patient has been advised of split billing requirements and indicates understanding: Yes     Healthy Habits:     Getting at least 3 servings of Calcium per day:  Yes    Bi-annual eye exam:  Yes    Dental care twice a year:  Yes    Sleep apnea or symptoms of sleep apnea:  None    Diet:  Regular (no restrictions)    Frequency of exercise:  None    Taking medications regularly:  No    Medication side effects:  None    PHQ-2 Total Score: 0    Additional concerns today:  Yes      PROBLEMS TO ADD ON...  1.  Listens to a fan or phone when going to sleep.  Right ear seems muffled or that he doesn't hear well.  Some pressure in the ear.  Noticed for the past month.  2.  Brings in blood sugar readings for review today.  His morning fasting sugars range from 146-214 with an average of approximately 175.  His postmeal sugars range from  with difficulty forming an average, possibly in the 220 range.  He has 1 postmeal sugar of 83 which concerned him.  He works from 4 PM to 12:30 AM.  He goes to bed around 2 to 3:00 AM and wakes around 11 AM.      Today's PHQ-2 Score:   PHQ-2 ( 1999 Pfizer) 9/29/2021   Q1: Little interest or pleasure in doing things 0   Q2: Feeling down, depressed or hopeless 0   PHQ-2 Score 0   Q1: Little interest or pleasure in doing things Not at all   Q2: Feeling down, depressed or hopeless Not at all   PHQ-2 Score 0       Abuse: Current or Past(Physical, Sexual or Emotional)- No  Do you feel safe in your environment? Yes    Have you ever done Advance Care Planning? (For example, a Health Directive, POLST, or a discussion with a medical provider or your loved ones about your wishes): No, advance care planning information given to patient to review.  Patient declined advance care planning discussion at this time.    Social History     Tobacco Use     Smoking status: Never Smoker     Smokeless  "tobacco: Never Used   Substance Use Topics     Alcohol use: Yes     Comment: rarely     If you drink alcohol do you typically have >3 drinks per day or >7 drinks per week? No    Alcohol Use 9/29/2021   Prescreen: >3 drinks/day or >7 drinks/week? Not Applicable   Prescreen: >3 drinks/day or >7 drinks/week? -       Last PSA: No results found for: PSA    Reviewed orders with patient. Reviewed health maintenance and updated orders accordingly - Yes  BP Readings from Last 3 Encounters:   09/29/21 130/84   08/30/21 124/84   07/26/21 116/74    Wt Readings from Last 3 Encounters:   10/05/21 124.1 kg (273 lb 8 oz)   09/29/21 125.3 kg (276 lb 4.8 oz)   09/14/21 126.6 kg (279 lb 1.6 oz)                  Patient Active Problem List   Diagnosis     Allergic rhinitis     Anosmia     Attention deficit hyperactivity disorder     Type 2 diabetes mellitus with hyperglycemia, without long-term current use of insulin (H)     Gastroesophageal reflux disease     Hyperlipidemia     Loss of taste     Mild persistent asthma without complication     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 No pertinent surgical history.    Social History     Tobacco Use     Smoking status: Never Smoker     Smokeless tobacco: Never Used   Substance Use Topics     Alcohol use: Yes     Comment: rare     Family History   Problem Relation Age of Onset     Diabetes Father      Coronary Artery Disease Maternal Grandmother      Heart Disease Maternal Grandmother      Diabetes Paternal Grandmother      Cancer Brother         \"bone\"     Prostate Cancer No family hx of      Colorectal Cancer No family hx of          Current Outpatient Medications   Medication Sig Dispense Refill     blood glucose (NO BRAND SPECIFIED) test strip        buPROPion (WELLBUTRIN XL) 150 MG 24 hr tablet Take 1 tablet (150 mg) by mouth every morning 30 tablet 1     Continuous Blood Gluc Sensor (FREESTYLE KATHE 14 " "DAY SENSOR) MISC 1 each every 14 days Change every 14 days. 2 each 5     FLUoxetine (PROZAC) 10 MG capsule Take 1 capsule (10 mg) by mouth daily 30 capsule 1     glipiZIDE (GLUCOTROL XL) 5 MG 24 hr tablet Take 2 tablets (10 mg) by mouth daily 180 tablet 1     JARDIANCE 25 MG TABS tablet Take 1 tablet by mouth daily       metFORMIN (GLUCOPHAGE) 1000 MG tablet Take 2,000 mg by mouth daily (with breakfast)       PROAIR RESPICLICK 108 (90 Base) MCG/ACT inhaler Inhale 2 puffs into the lungs as needed        simvastatin (ZOCOR) 20 MG tablet Take 1 tablet by mouth daily       Allergies   Allergen Reactions     Ragweeds        Reviewed and updated as needed this visit by clinical staff   Allergies  Meds              Reviewed and updated as needed this visit by Provider  Tobacco  Allergies  Meds  Problems  Med Hx  Surg Hx  Fam Hx  Soc Hx             Review of Systems  CONSTITUTIONAL: NEGATIVE for fever, chills, change in weight  INTEGUMENTARY/SKIN: NEGATIVE for worrisome rashes, moles or lesions  EYES: NEGATIVE for vision changes or irritation  ENT: NEGATIVE for ear, mouth and throat problems with the exception of right ear plugging as above  RESP: NEGATIVE for significant cough or SOB  CV: NEGATIVE for chest pain, palpitations or peripheral edema  GI: NEGATIVE for nausea, abdominal pain, heartburn, or change in bowel habits   male: negative for dysuria, hematuria, decreased urinary stream, erectile dysfunction, urethral discharge  MUSCULOSKELETAL: NEGATIVE for significant arthralgias or myalgia  NEURO: NEGATIVE for weakness, dizziness or paresthesias  PSYCHIATRIC: NEGATIVE for changes in mood or affect    OBJECTIVE:   /84   Pulse 80   Ht 1.899 m (6' 2.75\")   Wt 125.3 kg (276 lb 4.8 oz)   BMI 34.77 kg/m      Physical Exam  GENERAL: healthy, alert and no distress  EYES: Eyes grossly normal to inspection, PERRL and conjunctivae and sclerae normal  HENT: ear canals and TM's normal, nose and mouth without " ulcers or lesions  NECK: no adenopathy, no asymmetry, masses, or scars and thyroid normal to palpation  RESP: lungs clear to auscultation - no rales, rhonchi or wheezes  CV: regular rate and rhythm, normal S1 S2, no S3 or S4, no murmur, click or rub, no peripheral edema and peripheral pulses strong  ABDOMEN: soft, nontender, no hepatosplenomegaly, no masses and bowel sounds normal   (male): normal male genitalia without lesions or urethral discharge, no hernia  MS: no gross musculoskeletal defects noted, no edema  SKIN: no suspicious lesions or rashes  NEURO: Normal strength and tone, mentation intact and speech normal  PSYCH: mentation appears normal, affect normal/bright    Diagnostic Test Results:  None Today    ASSESSMENT/PLAN:   1. Routine general medical examination at a health care facility  Routine history and physical, updated in EMR.  Immunizations updated today.  See below for problem specific plans.  Plan repeat physical in 1 year.  - INFLUENZA VACCINE IM > 6 MONTHS VALENT IIV4 (AFLURIA/FLUZONE)    2. Type 2 diabetes mellitus with hyperglycemia, without long-term current use of insulin (H)  Patient is working with diabetic education.  He recently increased his glipizide and has been trying to make some positive changes in regard to diet and exercise.  Prescribed freestyle kathe as he has done well with this in the past.  He continues on maximum dose Jardiance and Metformin along with now 10 mg of glipizide.  We had previously discussed a GLP-1, we will hold off on this for now as patient works with diabetic education and make some positive changes.  Recommended diabetic eye exam at his earliest convenience.  Recommended follow-up in 3 months.  - Continuous Blood Gluc Sensor (FREESTYLE KATHE 14 DAY SENSOR) MISC; 1 each every 14 days Change every 14 days.  Dispense: 2 each; Refill: 5    3. Hyperlipidemia, unspecified hyperlipidemia type  Continues on simvastatin and tolerates this well.  LDL is less than  100, but not less than 70.  Because of patient's young age, will hold steady on his simvastatin for now.    4. Mild persistent asthma without complication  ACT score is within goal range using ProAir only.    5. MAREN (generalized anxiety disorder)  Patient is doing well on a low-dose of fluoxetine along with Wellbutrin.  His PHQ-9 and MAREN-7 scores are within goal range.    6. Attention deficit hyperactivity disorder (ADHD), combined type  Doing well on Wellbutrin.  Symptoms are well controlled per patient.    7. Right acute serous otitis media, recurrence not specified  Discussed over-the-counter Flonase or Nasacort nasal sprays.  If he develops pain or has continual plugging over the next 4 to 6 weeks, consider referral to ENT.      Patient has been advised of split billing requirements and indicates understanding: Yes  COUNSELING:   Reviewed preventive health counseling, as reflected in patient instructions  Special attention given to:        Regular exercise       Healthy diet/nutrition       Vision screening    Estimated body mass index is 37.85 kg/m  as calculated from the following:    Height as of 7/26/21: 1.829 m (6').    Weight as of 9/14/21: 126.6 kg (279 lb 1.6 oz).         He reports that he has never smoked. He has never used smokeless tobacco.        Miranda Cochran MD  [facility]

## 2021-09-29 NOTE — PATIENT INSTRUCTIONS
For your ear, try over-the-counter Flonase or Nasacort.    Schedule a diabetic eye exam with the eye doctor of your choice.  Let me know if you need a referral.      Preventive Health Recommendations  Male Ages 26 - 39    Yearly exam:             See your health care provider every year in order to  o   Review health changes.   o   Discuss preventive care.    o   Review your medicines if your doctor has prescribed any.    You should be tested each year for STDs (sexually transmitted diseases), if you re at risk.     After age 35, talk to your provider about cholesterol testing. If you are at risk for heart disease, have your cholesterol tested at least every 5 years.     If you are at risk for diabetes, you should have a diabetes test (fasting glucose).  Shots: Get a flu shot each year. Get a tetanus shot every 10 years.     Nutrition:    Eat at least 5 servings of fruits and vegetables daily.     Eat whole-grain bread, whole-wheat pasta and brown rice instead of white grains and rice.     Get adequate Calcium and Vitamin D.     Lifestyle    Exercise for at least 150 minutes a week (30 minutes a day, 5 days a week). This will help you control your weight and prevent disease.     Limit alcohol to one drink per day.     No smoking.     Wear sunscreen to prevent skin cancer.     See your dentist every six months for an exam and cleaning.

## 2021-09-30 ASSESSMENT — ASTHMA QUESTIONNAIRES: ACT_TOTALSCORE: 25

## 2021-10-05 ENCOUNTER — OFFICE VISIT (OUTPATIENT)
Dept: EDUCATION SERVICES | Facility: CLINIC | Age: 31
End: 2021-10-05
Payer: COMMERCIAL

## 2021-10-05 VITALS — WEIGHT: 273.5 LBS | BODY MASS INDEX: 34.41 KG/M2

## 2021-10-05 DIAGNOSIS — E11.65 TYPE 2 DIABETES MELLITUS WITH HYPERGLYCEMIA, WITHOUT LONG-TERM CURRENT USE OF INSULIN (H): Primary | ICD-10-CM

## 2021-10-05 PROCEDURE — G0108 DIAB MANAGE TRN  PER INDIV: HCPCS | Mod: AE

## 2021-10-05 NOTE — PROGRESS NOTES
Diabetes Self-Management Education & Support    Presents for: Individual review    Type of Visit: In Person    How would patient like to obtain AVS? Alberto was provided a copy of the AVS at conclusion of today's visit    ASSESSMENT:    Alberto is a 31-year-old who presents today for follow-up and review of blood glucose after initial consult on 9/14/2021 with an A1c not at ADA goal of less than 7%.  He arrived today unaccompanied and reported he is now wearing the 14-day [employer] personal CGM and has not linked to his phone.  We were able to successfully link his account to my practice portal and reviewed and discussed his most recent results today.  After our last visit Alberto has shown improvement with his glycemic control and was congratulated on this.  He says he finds personal CGM very useful and it has provided him more interest in managing his diabetes.  Since our last visit his dietary habits have also improved which I was happy to see and encouraged him to keep up the good work-he is eating less candy and overall has been putting more thought into his intake.    Patient's most recent   Lab Results   Component Value Date    A1C 10.1 08/30/2021    is not meeting goal of <7.0    Diabetes knowledge and skills assessment:   Patient is knowledgeable in diabetes management concepts related to: Being Active and Monitoring    Continue education with the following diabetes management concepts: Healthy Eating, Taking Medication, Problem Solving and Reducing Risks    Based on learning assessment above, most appropriate setting for further diabetes education would be: Individual setting.    INTERVENTIONS:    Education provided today on:  AADE Self-Care Behaviors:  Healthy Eating: carbohydrate counting, consistency in amount, composition, and timing of food intake, eating out and portion control  Monitoring: log and interpret results, individual blood glucose targets and frequency of monitoring  Taking Medication: action of  prescribed medication, side effects of prescribed medications and when to take medications  Problem Solving: high blood glucose - causes, signs/symptoms, treatment and prevention, low blood glucose - causes, signs/symptoms, treatment and prevention and when to call health care provider  Reducing Risks: prevention, early diagnostic measures and treatment of complications and A1C - goals, relating to blood glucose levels, how often to check    Opportunities for ongoing education and support in diabetes-self management were discussed. Pt verbalized understanding of concepts discussed and recommendations provided today.       Education Materials Provided:  No new materials provided today      Goals Addressed as of 10/5/2021 at 11:21 AM                    Today       Monitoring (pt-stated)   100%    Added 9/14/21 by Renita Verdugo, RN      I will monitor my blood glucose at least 3 times each day using the CGM that was provided a sample during today's visit 9/14/2021            PLAN    Instructed Alberto to continue monitoring his blood glucose throughout the day.  I showed him various parts of the hetras mckinley to use to obtain more specific information with his daily patterns, trends and timing range.  Nutrition: Continue to work on improvements with diet.  This includes portions, monitoring of carbohydrates, meal planning and snacks.  Medications: Continue Metformin 2000 mg p.o. daily glipizide XL 10 mg p.o. daily and Jardiance 25 mg p.o. daily.  We did discuss increasing the glipizide to 15 mg daily but Alberto was hesitant and somewhat resistant because of the shakiness he felt when we increased to 10 mg.  We agreed to give it another month to see how he does with lifestyle changes.  Activity: Goal is 30 minutes moderate activity/exercise daily.  We will discuss this more at her next visit.  Alberto was instructed to call with any questions or concerns that come up before next visit.    Topics to cover at upcoming visits: Healthy  "Eating, Being Active, Taking Medication, Problem Solving and Reducing Risks  Follow-up: Has been scheduled for November 2, 2021    See Goals Section for co-developed, patient-stated behavior change goals.  AVS provided to patient today.          SUBJECTIVE / OBJECTIVE:  Presents for: Individual review  Accompanied by: Self  Diabetes education in the past 24 mo: No  Focus of Visit: Assistance w/ making life changes, Healthy Eating, Healthy Coping, Taking Medication, Reducing Risks, Problem Solving, Self-care behavioral goal setting  Diabetes type: Type 2  Date of diagnosis: 2017  Disease course: Improving  How confident are you filling out medical forms by yourself:: Somewhat  Transportation concerns: No  Difficulty affording diabetes medication?: No  Difficulty affording diabetes testing supplies?: Sometimes  Other concerns:: None  Cultural Influences/Ethnic Background:  Not  or       Diabetes Symptoms & Complications:  Fatigue: Yes  Polydipsia: Sometimes  Polyphagia: Sometimes  Polyuria: Yes  Slow healing wounds: Yes  Symptom course: Stable  Weight trend: Decreasing  Complications assessed today?: No    Patient Problem List and Family Medical History reviewed for relevant medical history, current medical status, and diabetes risk factors.    Vitals:  Wt 124.1 kg (273 lb 8 oz)   BMI 34.41 kg/m    Estimated body mass index is 34.41 kg/m  as calculated from the following:    Height as of 9/29/21: 1.899 m (6' 2.75\").    Weight as of this encounter: 124.1 kg (273 lb 8 oz).   Last 3 BP:   BP Readings from Last 3 Encounters:   09/29/21 130/84   08/30/21 124/84   07/26/21 116/74       History   Smoking Status     Never Smoker   Smokeless Tobacco     Never Used       Labs:  Lab Results   Component Value Date    A1C 10.1 08/30/2021     Lab Results   Component Value Date     08/30/2021     Lab Results   Component Value Date    LDL 88 08/30/2021     Direct Measure HDL   Date Value Ref Range Status "   08/30/2021 37 (L) >=40 mg/dL Final     Comment:     HDL Cholesterol Reference Range:     0-2 years:   No reference ranges established for patients under 2 years old  at [facility] for lipid analytes.    2-8 years:  Greater than 45 mg/dL     18 years and older:   Female: Greater than or equal to 50 mg/dL   Male:   Greater than or equal to 40 mg/dL   ]  GFR Estimate   Date Value Ref Range Status   08/30/2021 >90 >60 mL/min/1.73m2 Final     Comment:     As of July 11, 2021, eGFR is calculated by the CKD-EPI creatinine equation, without race adjustment. eGFR can be influenced by muscle mass, exercise, and diet. The reported eGFR is an estimation only and is only applicable if the renal function is stable.   11/16/2020 >60 >60 mL/min/1.73m2 Final     GFR Estimate If Black   Date Value Ref Range Status   11/16/2020 >60 >60 mL/min/1.73m2 Final     Lab Results   Component Value Date    CR 0.78 08/30/2021     No results found for: MICROALBUMIN    Healthy Eating:  Healthy Eating Assessed Today: Yes  Cultural/Pentecostalism diet restrictions?: No  Meal planning/habits: None, Smaller portions  How many times a week on average do you eat food made away from home (restaurant/take-out)?: 5+  Meals include: Breakfast, Lunch, Dinner, Evening Snack, Afternoon Snack  Breakfast: Potatoes made in the air Fryer seasoned with ranch dressing or 3 eggs, 1 cup shredded potatoes and some type of meat  Lunch: Continues to have most lunches away from home-fast food-yesterday was [employer] -reviewed with him the importance of watching portions regarding foods that will elevate BG  Dinner: Last night was a whole 12 inch pizza  Other: States he has significantly cut back on candy-congratulated him on this  Beverages: Water, Diet soda, Other, Milk (SHIMON, Gatorade 0)  Has patient met with a dietitian in the past?: Yes ([place] [facility] [place]    Being Active:  Being Active Assessed Today: Yes  Exercise:: Currently not  exercising  Barrier to exercise: None    Monitoring:  Monitoring Assessed Today: Yes  Did patient bring glucose meter to appointment? : Yes  Blood Glucose Meter: CGM  Times checking blood sugar at home (number): 5+ (This is not consistent)  Times checking blood sugar at home (per): Day  Blood glucose trend: Fluctuating    Glucose data:              The reports were reviewed and discussed with Alberto during her visit today.  Note Alberto was out of town for the weekend, staying at hotel for work and is eating habits were not the best.  But I think showing him the data visually helps better grasp and be in tune with his diabetes.  I did point out to him that based upon the data collected from his sensor his average blood glucose was 205 mg/dL which was comparable to a lower A1c of a little over 8.5%.  Informed him that the changes he has made are making a difference already and encouraged him to keep it up.    Sleeps typically until 11 am - first meal is at around noon.    Taking Medications:  Diabetes Medication(s)     Biguanides       metFORMIN (GLUCOPHAGE) 1000 MG tablet    Take 2,000 mg by mouth daily (with breakfast)    Sodium-Glucose Co-Transporter 2 (SGLT2) Inhibitors       JARDIANCE 25 MG TABS tablet    Take 1 tablet by mouth daily    Sulfonylureas       glipiZIDE (GLUCOTROL XL) 5 MG 24 hr tablet    Take 2 tablets (10 mg) by mouth daily          Taking Medication Assessed Today: Yes  Current Treatments: Oral Medication (taken by mouth)  Problems taking diabetes medications regularly?: No (Alberto has significantly improved with his adherence)  Diabetes medication side effects?: Yes (Reports experiencing some shakiness when BG gets to be in the 100's after increasing glipizide dose, explained to him that this is most likely his body's reaction to not having the amount of sugar it is become accustomed to)    Problem Solving:  Problem Solving Assessed Today: Yes  Is the patient at risk for hypoglycemia?: No               Reducing Risks:  Reducing Risks Assessed Today: Yes  Diabetes Risks: Hyperlipidemia  CAD Risks: Diabetes Mellitus, Male sex, Obesity, Sedentary lifestyle  Feet checked by healthcare provider in the last year?: Yes    Healthy Coping:  Healthy Coping Assessed Today: Yes  Emotional response to diabetes: Ready to learn  Stage of change: PREPARATION (Decided to change - considering how)  Support resources: In-person Offerings  Patient Activation Measure Survey Score:  No flowsheet data found.          Thank you,  Renita Beck RN [facility]  Certified Diabetes Care and   Visit type : DSMT      Time Spent: 60 minutes  Encounter Type: Individual        Any diabetes medication dose changes were made via the Certified Diabetes Care & Education Protocol in collaboration with the patient's referring provider and primary care provider. A copy of this encounter was shared with the provider.

## 2021-10-05 NOTE — PATIENT INSTRUCTIONS
1. Eat 3 balanced meals each day - Monitor carb intake and limit to 60-75 grams per meal  This would be equal to 4-5 choices ~  1 choice = 15 grams    Do not wait longer than 4-5 hours to eat something  Snacks limit to no more than 30 grams of carbohydrates or 2 choices  Make sure you include protein source with each meal and at bedtime - this has been shown to help with blood glucose elevations    2. Check blood sugars 2-3  times each day   Fasting and before meal target is 80 - 130   2 hours after a meal target is < 180  remember to bring meter and log book to all appointments    3. Incorporate 30 minutes activity into each day - does not need to be all at one time & walking counts    4. Take diabetes medications as prescribed continue all as prescribed

## 2021-11-05 DIAGNOSIS — F41.1 GAD (GENERALIZED ANXIETY DISORDER): ICD-10-CM

## 2021-11-08 RX ORDER — FLUOXETINE 10 MG/1
CAPSULE ORAL
Qty: 90 CAPSULE | Refills: 1 | Status: SHIPPED | OUTPATIENT
Start: 2021-11-08 | End: 2022-03-25

## 2021-11-08 NOTE — TELEPHONE ENCOUNTER
"Last Written Prescription Date:  7/26/21  Last Fill Quantity: 30,  # refills: 1   Last office visit provider:  9/29/21     Requested Prescriptions   Pending Prescriptions Disp Refills     FLUoxetine (PROZAC) 10 MG capsule [Pharmacy Med Name: FLUOXETINE HCL 10 MG CAPSULE] 30 capsule 1     Sig: TAKE 1 CAPSULE BY MOUTH EVERY DAY       SSRIs Protocol Passed - 11/5/2021  8:32 AM        Passed - Recent (12 mo) or future (30 days) visit within the authorizing provider's specialty     Patient has had an office visit with the authorizing provider or a provider within the authorizing providers department within the previous 12 mos or has a future within next 30 days. See \"Patient Info\" tab in inbasket, or \"Choose Columns\" in Meds & Orders section of the refill encounter.              Passed - Medication is active on med list        Passed - Patient is age 18 or older             Michelle Dean RN 11/08/21 10:58 AM  "

## 2021-11-12 ENCOUNTER — TRANSFERRED RECORDS (OUTPATIENT)
Dept: MULTI SPECIALTY CLINIC | Facility: CLINIC | Age: 31
End: 2021-11-12
Payer: COMMERCIAL

## 2021-11-12 ENCOUNTER — TRANSFERRED RECORDS (OUTPATIENT)
Dept: HEALTH INFORMATION MANAGEMENT | Facility: CLINIC | Age: 31
End: 2021-11-12
Payer: COMMERCIAL

## 2021-11-12 LAB — RETINOPATHY: NEGATIVE

## 2021-11-23 ENCOUNTER — OFFICE VISIT (OUTPATIENT)
Dept: EDUCATION SERVICES | Facility: CLINIC | Age: 31
End: 2021-11-23
Payer: COMMERCIAL

## 2021-11-23 DIAGNOSIS — E11.65 TYPE 2 DIABETES MELLITUS WITH HYPERGLYCEMIA, WITHOUT LONG-TERM CURRENT USE OF INSULIN (H): Primary | ICD-10-CM

## 2021-11-23 PROCEDURE — G0108 DIAB MANAGE TRN  PER INDIV: HCPCS | Mod: AE

## 2021-11-23 RX ORDER — DULAGLUTIDE 0.75 MG/.5ML
0.75 INJECTION, SOLUTION SUBCUTANEOUS
Qty: 2 ML | Refills: 1 | Status: SHIPPED | OUTPATIENT
Start: 2021-11-23 | End: 2022-02-09

## 2021-11-23 NOTE — PROGRESS NOTES
Diabetes Self-Management Education & Support    Presents for: Individual review    Type of Visit: In Person    How would patient like to obtain AVS?  Alberto was provided a copy of his AVS at the conclusion of today's visit.    ASSESSMENT:    Alberto is a 31-year-old male who presents today for review of his blood glucose and to assess his current diabetes self-management skills with an A1c not at ADA goal of less than 7%.  He arrived today unaccompanied and is currently using a [employer] 14-day CGM to monitor his blood glucose.  Requested this be changed to kory 2 as he now has gotten an apple phone and will be able to use this as a .  Per his report Alberto has been taking all diabetes medications as prescribed with no missed or skipped doses  Review of his AGP report glucose numbers have improved however control is still poor.  Prior to today's visit I consulted with Dr. Meek with endocrinology regarding Ryan blood glucose numbers and patterns and he recommended obtaining a C-peptide to rule out the possibility of type I.  I contacted Alberto's PCP, Dr. Cochran, who approved placing the order for the C-peptide.  Review of Alberto's dietary intake shows he has improved somewhat however we had a lengthy discussion regarding his food choices and meal planning today.  Cautioned Alberto today that if he continues to not take his health and diabetes seriously he will more than likely end up being dependent on insulin.  This seemed to make an impression on him as after I said that he became more engaged in making changes in his diet and lifestyle.  We continued with nutritional counseling-reviewing foods that will/will not affect blood glucose and making suggestions for food options and meal planning.  I gave him another placement today and a complementary copy of the calorie Adrián, calorie fat and carbohydrate counter book.  It was agreed between the both of us that being seen in office more frequently might help with his  accountability and adherence so I will follow-up with him again in 2 weeks.  Additionally, after speaking with Dr. Cochran will initiate a GLP 1 agonist (Trulicity) to Ryan current therapy.  He was provided a voucher for 1 month free sample.    Patient's most recent   Lab Results   Component Value Date    A1C 10.1 08/30/2021    is not meeting goal of <7.0    Diabetes knowledge and skills assessment:       Continue education with the following diabetes management concepts: Healthy Eating, Being Active, Monitoring, Taking Medication, Problem Solving, Reducing Risks and Healthy Coping    Based on learning assessment above, most appropriate setting for further diabetes education would be: Individual setting.    INTERVENTIONS:    Education provided today on:  AADE Self-Care Behaviors:  Healthy Eating: carbohydrate counting, consistency in amount, composition, and timing of food intake, weight reduction, eating out, portion control and snacking  Being Active: relationship to blood glucose and describe appropriate activity program  Monitoring: purpose, log and interpret results, individual blood glucose targets and frequency of monitoring  Reducing Risks: major complications of diabetes, prevention, early diagnostic measures and treatment of complications and A1C - goals, relating to blood glucose levels, how often to check  Healthy Coping: recognize feelings about diagnosis, benefits of making appropriate lifestyle changes and utilize support systems    Opportunities for ongoing education and support in diabetes-self management were discussed. Pt verbalized understanding of concepts discussed and recommendations provided today.       Education Materials Provided:  Fast Food Guide and My Plate Planner      Goals Addressed as of 11/23/2021 at 2:26 PM                    Today    10/5/21       Monitoring (pt-stated)   80%  100%    Added 9/14/21 by Renita Verdugo, RN      I will monitor my blood glucose at least 3 times each  day using the CGM that was provided a sample during today's visit 9/14/2021            PLAN    We will continue to use his kory CGM to monitor his blood glucose-he is also linked to the [employer] view portal.  I encouraged him to scan often, especially before and after meals and at waking.  Nutrition: Work on eating 3 balanced meals each day-monitor carb intake and limit to 60 g for meals and 30 g or less for snacks.  Strongly emphasized making sure he is including a protein with each meal and at bedtime.  Medications: Continue Metformin and glipizide (he did increase to 15 mg as had been remember recommended at her last visit) and will start Trulicity at 0.75 mg weekly.  Education and instruction for use of pen were provided to him today during the visit.  Activity: Encouraged Alberto to work on increasing his activity level with goal being 30 minutes of moderate activity/exercise each day.  He was instructed to call or My Chart message with any questions or concerns that might come up before visit again in 2 weeks.  Topics to cover at upcoming visits: Healthy Eating, Being Active, Monitoring, Taking Medication, Problem Solving, Reducing Risks and Healthy Coping  Follow-up: 12/7/21      See Goals Section for co-developed, patient-stated behavior change goals.  AVS provided to patient today.          SUBJECTIVE / OBJECTIVE:  Presents for: Individual review  Accompanied by: Self  Diabetes education in the past 24 mo: Yes  Focus of Visit: Monitoring,Reducing Risks,Taking Medication,Healthy Coping,Healthy Eating,Problem Solving,Being Active,Assistance w/ making life changes,Self-care behavioral goal setting  Diabetes type: Type 2  Date of diagnosis: 2017  Disease course: Getting harder to manage  How confident are you filling out medical forms by yourself:: Somewhat  Transportation concerns: No  Other concerns:: None  Cultural Influences/Ethnic Background:  Not  or       Diabetes Symptoms &  "Complications:  Fatigue: Sometimes  Polydipsia: Sometimes  Weight trend: Increasing  Complications assessed today?: Yes  Heart disease: Yes  Nephropathy: Yes  Peripheral neuropathy: Yes  Retinopathy: Yes  Sexual dysfunction: Yes    Patient Problem List and Family Medical History reviewed for relevant medical history, current medical status, and diabetes risk factors.    Vitals:  There were no vitals taken for this visit.  Estimated body mass index is 34.41 kg/m  as calculated from the following:    Height as of 9/29/21: 1.899 m (6' 2.75\").    Weight as of 10/5/21: 124.1 kg (273 lb 8 oz).   Last 3 BP:   BP Readings from Last 3 Encounters:   09/29/21 130/84   08/30/21 124/84   07/26/21 116/74       History   Smoking Status     Never Smoker   Smokeless Tobacco     Never Used       Labs:  Lab Results   Component Value Date    A1C 10.1 08/30/2021     Lab Results   Component Value Date     08/30/2021     Lab Results   Component Value Date    LDL 88 08/30/2021     Direct Measure HDL   Date Value Ref Range Status   08/30/2021 37 (L) >=40 mg/dL Final     Comment:     HDL Cholesterol Reference Range:     0-2 years:   No reference ranges established for patients under 2 years old  at [facility] for lipid analytes.    2-8 years:  Greater than 45 mg/dL     18 years and older:   Female: Greater than or equal to 50 mg/dL   Male:   Greater than or equal to 40 mg/dL   ]  GFR Estimate   Date Value Ref Range Status   08/30/2021 >90 >60 mL/min/1.73m2 Final     Comment:     As of July 11, 2021, eGFR is calculated by the CKD-EPI creatinine equation, without race adjustment. eGFR can be influenced by muscle mass, exercise, and diet. The reported eGFR is an estimation only and is only applicable if the renal function is stable.   11/16/2020 >60 >60 mL/min/1.73m2 Final     GFR Estimate If Black   Date Value Ref Range Status   11/16/2020 >60 >60 mL/min/1.73m2 Final     Lab Results   Component Value Date    CR 0.78 " 08/30/2021     No results found for: MICROALBUMIN    Healthy Eating:  Healthy Eating Assessed Today: Yes  Cultural/Sabianist diet restrictions?: No  How many times a week on average do you eat food made away from home (restaurant/take-out)?: 5+  Meals include: Breakfast,Lunch,Dinner,Morning Snack,Evening Snack (The following are the most current)  Breakfast: 11:30 AM 1 cup of cereal (frosted mini  wheats) with milk  Lunch: 7:30 PM- [employer] with 1/4-1/2 order fries  Dinner: 4 slices of pizza or cereal  Snacks: 3:30 PM will have his afternoon snack-cup of chili and egg crispy chicken sandwich with no bun from [employer]  Beverages: Water,Diet soda,Other (crystal light)    Being Active:  Being Active Assessed Today: Yes  Exercise:: Currently not exercising  Barrier to exercise: None    Monitoring:  Monitoring Assessed Today: Yes  Did patient bring glucose meter to appointment? :  (He is currently linked to the [employer] portal)  Blood Glucose Meter: CGM  Blood glucose trend: Fluctuating    Glucose data:  *      Taking Medications:  Diabetes Medication(s)     Biguanides       metFORMIN (GLUCOPHAGE) 1000 MG tablet    Take 2,000 mg by mouth daily (with breakfast)    Sodium-Glucose Co-Transporter 2 (SGLT2) Inhibitors       JARDIANCE 25 MG TABS tablet    Take 1 tablet by mouth daily    Sulfonylureas       glipiZIDE (GLUCOTROL XL) 5 MG 24 hr tablet    Take 2 tablets (10 mg) by mouth daily    Incretin Mimetic Agents (GLP-1 Receptor Agonists)       dulaglutide (TRULICITY) 0.75 MG/0.5ML pen    Inject 0.75 mg Subcutaneous every 7 days          Taking Medication Assessed Today: Yes  Current Treatments: Oral Medication (taken by mouth)  Problems taking diabetes medications regularly?: No  Diabetes medication side effects?: No    Problem Solving:  Problem Solving Assessed Today: Yes              Reducing Risks:  Reducing Risks Assessed Today: Yes  Diabetes Risks: Sedentary Lifestyle,Family History,Hyperlipidemia  CAD Risks: Diabetes  Mellitus,Family history,Male sex,Sedentary lifestyle  Has dilated eye exam at least once a year?: No  Sees dentist every 6 months?: No    Healthy Coping:  Healthy Coping Assessed Today: Yes  Emotional response to diabetes: Uncertain  Informal Support system:: Significant other  Stage of change: CONTEMPLATION (Considering change and yet undecided)  Support resources: In-person Offerings  Patient Activation Measure Survey Score:  No flowsheet data found.          Thank you,  Renita Beck RN [facility]  Certified Diabetes Care and   Visit type : DSMT      Time Spent: 60 minutes  Encounter Type: Individual        Any diabetes medication dose changes were made via the Certified Diabetes Care & Education Protocol in collaboration with the patient's referring provider and primary care provider. A copy of this encounter was shared with the provider.    Much or all of the text in this note was generated through the use of the Dragon Dictate voice-to-text software.Errors in spelling or words which seem out of context are unintentional. Sound alike errors, in particular, may have escaped editing.

## 2021-11-23 NOTE — PATIENT INSTRUCTIONS
1. Eat 3 balanced meals each day - Monitor carb intake and limit to 60 grams per meal  This would be equal to 4 choices ~  1 choice = 15 grams    Do not wait longer than 4-5 hours to eat something  Snacks limit to no more than 30 grams of carbohydrates or 2 choices  Make sure you include protein source with each meal and at bedtime - this has been shown to help with blood glucose elevations  Peanut Butter toast for breakfast and USE THAT BOOK I GAVE YOU !!!!!    2. Check blood sugars several  times each day -    Fasting and before meal target is 80 - 130   2 hours after a meal target is < 180  remember to bring meter and log book to all appointments    3. Incorporate 30 minutes activity into each day - does not need to be all at one time & walking counts    Find out cost on Trulicity and Ozempic     4. Take diabetes medications as prescribed continue Metformin 2000 mg daily , continue glipizide at 15 gm daily and start Trulicity - 0.75 mg once a week on Sundays  Stop Jardiance for now

## 2021-12-07 ENCOUNTER — OFFICE VISIT (OUTPATIENT)
Dept: EDUCATION SERVICES | Facility: CLINIC | Age: 31
End: 2021-12-07
Payer: COMMERCIAL

## 2021-12-07 ENCOUNTER — LAB (OUTPATIENT)
Dept: LAB | Facility: CLINIC | Age: 31
End: 2021-12-07
Payer: COMMERCIAL

## 2021-12-07 VITALS — WEIGHT: 276.2 LBS | BODY MASS INDEX: 34.75 KG/M2

## 2021-12-07 DIAGNOSIS — E11.65 TYPE 2 DIABETES MELLITUS WITH HYPERGLYCEMIA, WITHOUT LONG-TERM CURRENT USE OF INSULIN (H): ICD-10-CM

## 2021-12-07 DIAGNOSIS — E11.65 TYPE 2 DIABETES MELLITUS WITH HYPERGLYCEMIA, WITHOUT LONG-TERM CURRENT USE OF INSULIN (H): Primary | ICD-10-CM

## 2021-12-07 PROCEDURE — 84681 ASSAY OF C-PEPTIDE: CPT

## 2021-12-07 PROCEDURE — 36415 COLL VENOUS BLD VENIPUNCTURE: CPT

## 2021-12-07 PROCEDURE — G0108 DIAB MANAGE TRN  PER INDIV: HCPCS | Mod: AE

## 2021-12-07 NOTE — PATIENT INSTRUCTIONS
1. Eat 3 balanced meals each day - Monitor carb intake and limit to 60 grams per meal  This would be equal to 4 choices ~  1 choice = 15 grams    Do not wait longer than 4-5 hours to eat something  Snacks limit to no more than 30 grams of carbohydrates or 2 choices  Do not go lower than 45 grams per meal  Make sure you include protein source with each meal and at bedtime - this has been shown to help with blood glucose elevations    2. Check blood sugars several   times each day scan at least once every 4 hours - if you go longer than 8 hours the sensor will go to sleep   Fasting and before meal target is 80 - 130   2 hours after a meal target is < 180  remember to bring meter and log book to all appointments    3. Incorporate 30 minutes activity into each day - does not need to be all at one time & walking counts    4. Take diabetes medications as prescribed Continue Metformin 2000 daily,15 mg Glipizide,and 0.75 mg of Trulicity      
1.4

## 2021-12-08 LAB — C PEPTIDE SERPL-MCNC: 3.2 NG/ML (ref 0.9–6.9)

## 2021-12-08 NOTE — PROGRESS NOTES
Diabetes Self-Management Education & Support    Presents for: Individual review    Type of Visit: In Person    How would patient like to obtain AVS?  A copy of Alberto's AVS was provided to him at the conclusion of today's visit.    ASSESSMENT:    Alberto is a 31-year-old who presents today for follow-up clinic visit to review his latest blood glucose readings and assist and evaluate his current diabetes management skills with an A1c not at ADA goal.  He arrived today unaccompanied by his girlfriend Sade.  Currently have Alberto linked to the [employer] portal, so we were able to review and discuss his most recent blood glucose readings.  Since our last visit Alberto stated he has been making an effort to work on his diet.  I congratulated him on this and encouraged him to continue.  When we discussed his activity level he continues to be pretty sedentary so the set a step goal for him today of 5000+ steps each day to start.  After reviewing Alberto's BG readings from his CGM I informed him that I am seeing an improvement and congratulated him on this-I am hoping this will continue to motivate him towards change in better glycemic control.  Patient's most recent   Lab Results   Component Value Date    A1C 10.1 08/30/2021    is not meeting goal of <7.0    Diabetes knowledge and skills assessment:   Patient is knowledgeable in diabetes management concepts related to: Taking Medication    Continue education with the following diabetes management concepts: Healthy Eating, Being Active, Monitoring, Problem Solving, Reducing Risks and Healthy Coping    Based on learning assessment above, most appropriate setting for further diabetes education would be: Individual setting.    INTERVENTIONS:    Education provided today on:  AADE Self-Care Behaviors:  Healthy Eating: carbohydrate counting, consistency in amount, composition, and timing of food intake, eating out and portion control  Being Active: relationship to blood glucose and describe  appropriate activity program  Monitoring: purpose, log and interpret results, individual blood glucose targets and frequency of monitoring  Problem Solving: high blood glucose - causes, signs/symptoms, treatment and prevention  Reducing Risks: prevention, early diagnostic measures and treatment of complications, annual eye exam and A1C - goals, relating to blood glucose levels, how often to check  Healthy Coping: recognize feelings about diagnosis, benefits of making appropriate lifestyle changes and utilize support systems    Opportunities for ongoing education and support in diabetes-self management were discussed. Pt verbalized understanding of concepts discussed and recommendations provided today.       Education Materials Provided:  No new materials provided today          PLAN    Continue to monitor blood glucose using the kory system.  Eat 3 balanced meals daily monitoring your carb intake following the ADA guidelines for meals and snacks.  Medications: We did discuss possibly increasing his Trulicity dose to 1.5 mg today but decided we will let him work on improving his diet and increasing his activity level for a bit more.  Will reassess at our next follow-up in a few weeks.  Activity: Encouraged Alberto to work on increasing his activity-suggested getting out of the house and walking around the mall on his day off.  Stuart's that she would help with this.  Alberto was instructed to call with any questions or concerns that might come up before follow-up and I reminded him he needs to scan at least once every 8 hours to keep sensor from going dormant.    Topics to cover at upcoming visits: Healthy Eating, Being Active, Problem Solving and Reducing Risks  Follow-up: 1/4/2022    See Goals Section for co-developed, patient-stated behavior change goals.  AVS provided to patient today.          SUBJECTIVE / OBJECTIVE:  Presents for: Individual review  Accompanied by: Self,Significant other (Shook (shay))  Diabetes  "education in the past 24 mo: Yes  Focus of Visit: Monitoring,Reducing Risks,Taking Medication,Healthy Coping,Healthy Eating,Problem Solving,Being Active,Assistance w/ making life changes,Self-care behavioral goal setting  Diabetes type: Type 2  Date of diagnosis: 2017  Disease course: Getting harder to manage  How confident are you filling out medical forms by yourself:: Somewhat  Transportation concerns: No  Difficulty affording diabetes medication?: Sometimes  Other concerns:: None  Cultural Influences/Ethnic Background:  Not  or       Diabetes Symptoms & Complications:  Fatigue: Sometimes  Polydipsia: Sometimes  Weight trend: Increasing  Complications assessed today?: Yes  Autonomic neuropathy: No  CVA: No  Heart disease: No  Nephropathy: No  Peripheral neuropathy: No  Peripheral Vascular Disease: No  Retinopathy: No  Sexual dysfunction: No    Patient Problem List and Family Medical History reviewed for relevant medical history, current medical status, and diabetes risk factors.    Vitals:  Wt 125.3 kg (276 lb 3.2 oz)   BMI 34.75 kg/m    Estimated body mass index is 34.75 kg/m  as calculated from the following:    Height as of 9/29/21: 1.899 m (6' 2.75\").    Weight as of this encounter: 125.3 kg (276 lb 3.2 oz).   Last 3 BP:   BP Readings from Last 3 Encounters:   09/29/21 130/84   08/30/21 124/84   07/26/21 116/74       History   Smoking Status     Never Smoker   Smokeless Tobacco     Never Used       Labs:  Lab Results   Component Value Date    A1C 10.1 08/30/2021     Lab Results   Component Value Date     08/30/2021     Lab Results   Component Value Date    LDL 88 08/30/2021     Direct Measure HDL   Date Value Ref Range Status   08/30/2021 37 (L) >=40 mg/dL Final     Comment:     HDL Cholesterol Reference Range:     0-2 years:   No reference ranges established for patients under 2 years old  at [facility] for lipid analytes.    2-8 years:  Greater than 45 mg/dL     18 years " and older:   Female: Greater than or equal to 50 mg/dL   Male:   Greater than or equal to 40 mg/dL   ]  GFR Estimate   Date Value Ref Range Status   08/30/2021 >90 >60 mL/min/1.73m2 Final     Comment:     As of July 11, 2021, eGFR is calculated by the CKD-EPI creatinine equation, without race adjustment. eGFR can be influenced by muscle mass, exercise, and diet. The reported eGFR is an estimation only and is only applicable if the renal function is stable.   11/16/2020 >60 >60 mL/min/1.73m2 Final     GFR Estimate If Black   Date Value Ref Range Status   11/16/2020 >60 >60 mL/min/1.73m2 Final     Lab Results   Component Value Date    CR 0.78 08/30/2021     No results found for: MICROALBUMIN    Healthy Eating:  Healthy Eating Assessed Today: Yes  Cultural/Faith diet restrictions?: No  Meal planning/habits: Carb counting,Smaller portions,Avoiding sweets  How many times a week on average do you eat food made away from home (restaurant/take-out)?: 5+  Meals include: Breakfast,Lunch,Dinner,Morning Snack,Evening Snack (The following are the most current)  Breakfast: 11:30 AM - noon:  wakes & has been having nothing before going to work at 4 pm  Lunch: before he leaves for work he will either have something at home like a sandwich or stop on the way _ [employer] chili OR 2 plain crispy chicken with no bun OR chix nuggets  Dinner: when he gets home from work 1230 - 1 am will have something - lately has been taco in a bag from [employer]  (18 gm CHO) or maybe a slice or 2 of Pizza  Snacks: Beef jerk - cheese  Other: from conversation today dietary intake has substantially improved . [place] have been eliminated and he is actually monitoring his CHO - Commended him on this !!  Beverages: Water,Diet soda,Other (crystal light)    Being Active:  Being Active Assessed Today: Yes  Exercise:: Currently not exercising  Barrier to exercise: None    Monitoring:  Monitoring Assessed Today: Yes  Did patient bring glucose meter to appointment?  :  (He is currently linked to the [facility] portal)  Blood Glucose Meter: CGM  Times checking blood sugar at home (number): 4  Times checking blood sugar at home (per): Day  Blood glucose trend: Fluctuating    Glucose data:  The following are pages from Alberto's most recent AGP.          to view the full report you can go to [facility]      Taking Medications:  Diabetes Medication(s)     Biguanides       metFORMIN (GLUCOPHAGE) 1000 MG tablet    Take 2,000 mg by mouth daily (with breakfast)    Sodium-Glucose Co-Transporter 2 (SGLT2) Inhibitors       JARDIANCE 25 MG TABS tablet    Take 1 tablet by mouth daily     Patient not taking: Reported on 12/7/2021    Sulfonylureas       glipiZIDE (GLUCOTROL XL) 5 MG 24 hr tablet    Take 2 tablets (10 mg) by mouth daily    Incretin Mimetic Agents (GLP-1 Receptor Agonists)       dulaglutide (TRULICITY) 0.75 MG/0.5ML pen    Inject 0.75 mg Subcutaneous every 7 days          Taking Medication Assessed Today: Yes  Current Treatments: Oral Medication (taken by mouth),Non-insulin Injectables  Problems taking diabetes medications regularly?: No  Diabetes medication side effects?: No    Problem Solving:  Problem Solving Assessed Today: Yes              Reducing Risks:  Reducing Risks Assessed Today: Yes  Diabetes Risks: Sedentary Lifestyle,Family History,Hyperlipidemia  CAD Risks: Diabetes Mellitus,Family history,Male sex,Sedentary lifestyle  Has dilated eye exam at least once a year?: No  Sees dentist every 6 months?: No    Healthy Coping:  Healthy Coping Assessed Today: Yes  Emotional response to diabetes: Ready to learn  Informal Support system:: Significant other  Stage of change: ACTION (Actively working towards change)  Support resources: In-person Offerings  Patient Activation Measure Survey Score:  No flowsheet data found.          Thank you,  Renita Beck RN CDCES  Certified Diabetes Care and   Visit type : DSMT      Time Spent: 60 minutes  Encounter Type:  Individual        Any diabetes medication dose changes were made via the Certified Diabetes Care & Education Protocol in collaboration with the patient's referring provider and primary care provider. A copy of this encounter was shared with the provider.

## 2022-01-06 NOTE — PATIENT INSTRUCTIONS
"IUD Insertion Procedure Note    Subjective: The patient is here for a ParaGard insertion.  Attempt was made on 12/3/21 by Kerrie Al CNM, but she couldn't get past the internal os.    Objective:   /68 (BP Location: Right arm, Patient Position: Sitting, Cuff Size: Adult Regular)   Pulse 63   Ht 1.556 m (5' 1.25\")   Wt 66.7 kg (147 lb)   Body mass index is 27.55 kg/m .    Urine pregnancy test was not done as her  [place] and she hasn't had intercourse since she delivered on 10/26/21.    Procedure Details   The risks (including infection, bleeding, pain, and uterine perforation, expulsion and failure to prevent pregancy, uterine perforation) and benefits of the procedure were explained to the patient and informed consent was obtained.      A speculum was placed, and the cervix and upper vagina were prepped with Betadine. The anterior lip of the cervix was grasped with a single-tooth tenaculum.  Uterus was sounded to a depth of 8 cm, and the IUD was placed without difficulty in the uterine fundus.  Strings were trimmed to 3 cm in length.  Speculum and tenaculum were removed. The patient tolerated the procedure well.  Lot # [id number], exp date Jan 2027.    Assessment  Successful IUD insertion.    Plan  She is given instructions for checking her IUD string and is encouraged to call for any concerns.             " 1. Continue taking Wellbutrin 150 mg daily for 1 month, then take 150 mg every other day for 1 month, then stop.  2. Let's try a small dose of fluoxetine for your anxiety.  I sent this to your pharmacy.

## 2022-01-07 ENCOUNTER — OFFICE VISIT (OUTPATIENT)
Dept: INTERNAL MEDICINE | Facility: CLINIC | Age: 32
End: 2022-01-07
Payer: COMMERCIAL

## 2022-01-07 VITALS
SYSTOLIC BLOOD PRESSURE: 135 MMHG | HEART RATE: 92 BPM | DIASTOLIC BLOOD PRESSURE: 81 MMHG | TEMPERATURE: 97.6 F | WEIGHT: 286 LBS | OXYGEN SATURATION: 98 % | BODY MASS INDEX: 35.99 KG/M2

## 2022-01-07 DIAGNOSIS — E11.65 TYPE 2 DIABETES MELLITUS WITH HYPERGLYCEMIA, WITHOUT LONG-TERM CURRENT USE OF INSULIN (H): ICD-10-CM

## 2022-01-07 DIAGNOSIS — E78.5 HYPERLIPIDEMIA, UNSPECIFIED HYPERLIPIDEMIA TYPE: ICD-10-CM

## 2022-01-07 DIAGNOSIS — J32.9 SINUSITIS, UNSPECIFIED CHRONICITY, UNSPECIFIED LOCATION: Primary | ICD-10-CM

## 2022-01-07 PROBLEM — R43.2 LOSS OF TASTE: Status: RESOLVED | Noted: 2021-07-26 | Resolved: 2022-01-07

## 2022-01-07 PROBLEM — B94.8 SEQUELAE OF OTHER SPECIFIED INFECTIOUS AND PARASITIC DISEASES: Status: RESOLVED | Noted: 2021-07-26 | Resolved: 2022-01-07

## 2022-01-07 PROCEDURE — 99214 OFFICE O/P EST MOD 30 MIN: CPT | Performed by: INTERNAL MEDICINE

## 2022-01-07 RX ORDER — DOXYCYCLINE HYCLATE 100 MG
100 TABLET ORAL 2 TIMES DAILY
Qty: 20 TABLET | Refills: 0 | Status: SHIPPED | OUTPATIENT
Start: 2022-01-07 | End: 2022-01-17

## 2022-01-07 RX ORDER — OXYMETAZOLINE HYDROCHLORIDE 0.05 G/100ML
2 SPRAY NASAL 2 TIMES DAILY
COMMUNITY
End: 2022-05-12

## 2022-01-07 NOTE — PROGRESS NOTES
Assessment & Plan   Problem List Items Addressed This Visit     Type 2 diabetes mellitus with hyperglycemia, without long-term current use of insulin (H)    Hyperlipidemia      Other Visit Diagnoses     Sinusitis, unspecified chronicity, unspecified location    -  Primary    Relevant Medications    pseudoePHEDrine (SUDAFED) 30 MG/5ML liquid    oxymetazoline (AFRIN) 0.05 % nasal spray    doxycycline hyclate (VIBRA-TABS) 100 MG tablet           Sinusitis.  I am going to call him in a prescription for doxycycline given his relative immunosuppression from his uncontrolled diabetes.  Follow-up with us as needed.  He can also use Flonase for nasal congestion.         No follow-ups on file.    Jourdan Cochran MD  [facility]    Miri Perez is a 31 year old uncontrolled diabetic who presents for evaluation of sinus congestion.  He states that this has been going on for about the last 2 to 3 days.  He states that his right nostril is stuffed up and he has been also having some facial pain as well.  He has been using Afrin for the last 2 days.  He has been blowing his nose productive of yellowish mucus.  No fevers.  He has been experiencing some postnasal drainage and has been having a dry cough.  No phlegm production.  No shortness of breath.      Objective    /81   Pulse 92   Temp 97.6  F (36.4  C)   Wt 129.7 kg (286 lb)   SpO2 98%   BMI 35.99 kg/m    Body mass index is 35.99 kg/m .  Physical Exam     HEENT.  Oropharynx is clear.  Tender to percussion over both his maxillary and frontal sinuses.  No anterior posterior cervical and adenopathy.    Cardiovascular: S1-S2 regular rate and rhythm no gallops or rubs    Lungs are clear.

## 2022-01-17 ENCOUNTER — MYC MEDICAL ADVICE (OUTPATIENT)
Dept: EDUCATION SERVICES | Facility: CLINIC | Age: 32
End: 2022-01-17
Payer: COMMERCIAL

## 2022-01-19 ENCOUNTER — VIRTUAL VISIT (OUTPATIENT)
Dept: PSYCHOLOGY | Facility: CLINIC | Age: 32
End: 2022-01-19
Payer: COMMERCIAL

## 2022-01-19 DIAGNOSIS — Z86.59 HISTORY OF ATTENTION DEFICIT HYPERACTIVITY DISORDER (ADHD): ICD-10-CM

## 2022-01-19 DIAGNOSIS — F41.1 GAD (GENERALIZED ANXIETY DISORDER): ICD-10-CM

## 2022-01-19 PROCEDURE — 90834 PSYTX W PT 45 MINUTES: CPT | Mod: GT | Performed by: PSYCHOLOGIST

## 2022-01-19 NOTE — PROGRESS NOTES
[facility]   Mental Health & Addiction Services     Disclaimer: Voice recognition software was used to generate this note. As a result, wrong word or 'sound-a-like' substitutions may have occurred due to the inherent limitations of voice recognition software. There may be errors in the script that have gone undetected. Please consider this when interpreting information found in this chart.     ADHD assessment - Initial Visit    Patient  Name:  Chris Astorga Date: 24         Service Type: Individual     Visit Start Time: 10:00AM  Visit End Time: 10:45AM    Visit #: 1    Attendees: Client attended alone    Service Modality:  Video Visit:      Provider verified identity through the following two step process.  Patient provided:  Patient     Telemedicine Visit: The patient's condition can be safely assessed and treated via synchronous audio and visual telemedicine encounter.      Reason for Telemedicine Visit: Services only offered telehealth    Originating Site (Patient Location): Patient's home    Distant Site (Provider Location): Provider Remote Setting- Home Office    Consent:  The patient/guardian has verbally consented to: the potential risks and benefits of telemedicine (video visit) versus in person care; bill my insurance or make self-payment for services provided; and responsibility for payment of non-covered services.     Patient would like the video invitation sent by:  My Chart    Mode of Communication:  Video Conference via [facility]    As the provider I attest to compliance with applicable laws and regulations related to telemedicine.       DATA:   Interactive Complexity: No   Crisis: No     Presenting Concerns/  Current Stressors:   Client presents for session 1 of ADHD assessment. Diagnosed as a kid at age 7 . On meds he was able to study, off no motivation bored easily. Can get job advancement by studying but doesn't want to . 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 Has tried to take course 4 times and failed test 4 times, self-study programs. Major problems withtaking tests. Had accommodations in  and middle school. Tests readers, quiet room for taking tests. Procrastination. Potential talks every other year, especially if he wasn't interested (are you in top of your house.) Currently showes up at home, organized chaos. Picking up stuff, leaves pop cans everywhere. Has been able to make changes since frank moved in. Same thing with laundry.  Mother and sister diagnosed with ADHD. Mom also depression and high anxiety. Sister anxiety and previous diagnosis of bipolar. He was diagnosed with ODD as a kid. Half brother ADHD. Half sister possibly as well.   Hyperfocuses on phone reading comic books.     No alcohol or drug problems he knows of. Sister and cousin both use a lot of cannabis. Rare alcohol use  Possibly 3 over weekend. Diet Mtn Dew 12-36 per week.  Withdrawal headaches.  Lots of junk food. Nonsmoker.     ODD as a kid, major problems with authority. Always did what he was told he couldn't do something he automatically did. Homeless during Sr. Year in . Some umanzor theft kleptomania when he was not homeless. Mom lost her job and he and his sister wound up in homeless shelter, stayed with aunt part some time. Mom was relyant on grandmother for some time, grandmother  last year. Mom is employed and doing OK. Dad lives in [place] also a  sees about once per year. Refuses to come to [place]. Father was  in [employer]. Client born in [place] but grew up in [place]. Folks split when he was 4 before he can remember.     Asked to be hospitalised at age 21. Homeless, had a lot going on. Started thinking about OD'ing. Asked to be put on a watch. 72 hour hold no thoughts in 10 years. No legal history.     ASSESSMENT:  Mental Status Assessment:  Appearance:   Appropriate   Eye Contact:   Good   Psychomotor Behavior: Restless    Attitude:   Cooperative   Orientation:   All  Speech   Rate / Production: Normal/ Responsive   Volume:  Normal   Mood:    Normal  Affect:    Appropriate   Thought Content:  Clear   Thought Form:  Coherent   Insight:    Good       Safety Issues and Plan for Safety and Risk Management:   [place] Suicide Severity Rating Scale (Lifetime/Recent)No flowsheet data found.  Patient denies current fears or concerns for personal safety.  Patient denies current or recent suicidal ideation or behaviors.  Patient denies current or recent homicidal ideation or behaviors.  Patient denies current or recent self injurious behavior or ideation.  Patient denies other safety concerns.  Recommended that patient call 911 or go to the local ED should there be a change in any of these risk factors.  Patient reports there are no firearms in the house.     Diagnostic Criteria:  Attention Deficit Hyperactivity Disorder  A) A persistent pattern of inattention and/or hyperactivity-impulsivity that interferes with functioning or development, as characterized by (1) Inattention and/or (2) Hyperactivity and Impulsivity  (1) Inattention: 6 or more of the following symptoms have persisted for at least 6 months to a degree that is inconsistent with developmental level and that negatively impacts directly on social and academic/occupational activities:  - Often fails to give close attention to details or makes careless mistakes in schoolwork, at work, or during other activities  - Often has difficulty sustaining attention in tasks or play activities  - Often does not seem to listen when spoken to directly  - Often does not follow through on instructions and fails to finish schoolwork, chores, or duties in the workplace  - Often has difficulty organizing tasks and activities  - Often avoids, dislikes, or is reluctant to engage in tasks that require sustained mental effort  - Often loses things necessary for tasks or activities  - Is often easily  distractedby extraneous stimuli  - Is often forgetful in daily activities  B) Several inattentive or hyperactive-impulsive symptoms were present prior to age 12 years  C) Several inattentive or hyperactive-impulsive symptoms are present in two or more settings  D) There is clear evidence that the symptoms interfere with, or reduce the quality of, social academic, or occupational functioning  E) The Symptoms do not occur exclusively during the course of schizophrenia or another psychotic disorder and are not better explained by another mental disorder      DSM5 Diagnoses: (Sustained by DSM5 Criteria Listed Above)  Diagnoses: Attention-Deficit/Hyperactivity Disorder  314.00 (F90.0) Predominantly inattentive presentation  Psychosocial & Contextual Factors: none noted    Intervention:   Educated on treatment planning and started identifying goals and interventions for treatment plan    Collateral Reports Completed:  Not Applicable      PLAN: (Homework, other):  1. Provider will continue Diagnostic Assessment.  Patient was given the following to do until next session: Complete ADHD rating scales.          Soy Castano  January 20, 2022

## 2022-01-31 ENCOUNTER — VIRTUAL VISIT (OUTPATIENT)
Dept: PSYCHOLOGY | Facility: CLINIC | Age: 32
End: 2022-01-31
Payer: COMMERCIAL

## 2022-01-31 DIAGNOSIS — Z86.59 HISTORY OF ATTENTION DEFICIT HYPERACTIVITY DISORDER (ADHD): Primary | ICD-10-CM

## 2022-01-31 DIAGNOSIS — F41.1 GAD (GENERALIZED ANXIETY DISORDER): ICD-10-CM

## 2022-01-31 PROCEDURE — 90791 PSYCH DIAGNOSTIC EVALUATION: CPT | Mod: GT | Performed by: PSYCHOLOGIST

## 2022-01-31 NOTE — PROGRESS NOTES
[facility]    Disclaimer: Voice recognition software was used to generate this note. As a result, wrong word or 'sound-a-like' substitutions may have occurred due to the inherent limitations of voice recognition software. There may be errors in the script that have gone undetected. Please consider this when interpreting information found in this chart.     Provider Name:  Soy Castano PhD, LP    PATIENT'S NAME: Chris Astorga  PREFERRED NAME: Alberto  PRONOUNS:       MRN: [id number]  : 1990  ADDRESS: [address] [place] [address] [address]  [facility] [address]  ACCT. NUMBER:  [id number]  DATE OF SERVICE: 22  START TIME: 11:00AM  END TIME: 10:45AM  PREFERRED PHONE: [phone]  May we leave a program related message: Yes  SERVICE MODALITY:  Video Visit:      Provider verified identity through the following two step process.  Patient provided:  Patient     Telemedicine Visit: The patient's condition can be safely assessed and treated via synchronous audio and visual telemedicine encounter.      Reason for Telemedicine Visit: Services only offered telehealth    Originating Site (Patient Location): Patient's home    Distant Site (Provider Location): Provider Remote Setting- Home Office    Consent:  The patient/guardian has verbally consented to: the potential risks and benefits of telemedicine (video visit) versus in person care; bill my insurance or make self-payment for services provided; and responsibility for payment of non-covered services.     Patient would like the video invitation sent by:  My Chart    Mode of Communication:  Video Conference via [facility]    As the provider I attest to compliance with applicable laws and regulations related to telemedicine.    UNIVERSAL ADULT ADHD DIAGNOSTIC ASSESSMENT    Identifying Information:  Patient is a 31 year old, .  The pronoun use throughout this assessment reflects the patient's chosen pronoun.  Patient was referred for an assessmen  Self.  Patient attended the session alone.    Chief Complaint:   The purpose of this evaluation is to: provide treatment recommendations and clarify diagnosis. Patient reported seeking services at this time for diagnostic assessment and recommendations for treatment.  Patient reported that      has completed a previous ADHD diagnostic assessment at the age of age 7.  Patient has received a previous diagnosis of ADHD. Patient reported that medication has been prescribed to address these problems.  Patient reported that medication was helpful and did not cause unpleasant side effects.   Diagnosed as a kid at age 7 . On meds he was able to study, off no motivation bored easily. Can get job advancement by studying but doesn't want to .  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 Has tried to take course 4 times and failed test 4 times, self-study programs. Major problems withtaking tests. Had accommodations in HS and middle school. Tests readers, quiet room for taking tests. Procrastination. Potential talks every other year, especially if he wasn't interested (are you in top of your house.) Currently showes up at home, organized chaos. Picking up stuff, leaves pop cans everywhere. Has been able to make changes since frank moved in. Same thing with laundry.  Mother and sister diagnosed with ADHD. Mom also depression and high anxiety. Sister anxiety and previous diagnosis of bipolar. He was diagnosed with ODD as a kid. Half brother ADHD. Half sister possibly as well.   Hyperfocuses on phone reading comic books.      No alcohol or drug problems he knows of. Sister and cousin both use a lot of cannabis. Rare alcohol use  Possibly 3 over weekend. Diet Mtn Dew 12-36 per week.  Withdrawal headaches.  Lots of junk food. Nonsmoker.      ODD as a kid, major problems with authority. Always did what he was told he couldn't do something he automatically did. Homeless during Sr. Year  in HS. Some umanzor theft kleptomania when he was not homeless. Mom lost her job and he and his sister wound up in homeless shelter, stayed with aunt part some time. Mom was relyant on grandmother for some time, grandmother  last year. Mom is employed and doing OK. Dad lives in [place] also a  sees about once per year. Refuses to come to [place]. Father was  in [employer]. Client born in [place] but grew up in [place]. Folks split when he was 4 before he can remember.      Asked to be hospitalised at age 21. Homeless, had a lot going on. Started thinking about OD'ing. Asked to be put on a watch. 72 hour hold no thoughts in 10 years. No legal history.        Social/Family History:  Patient reported they grew up in [place], [place].  They were raised by biological mother, grandmother  and aunt.  Parents Split when he was 4.  Patient reported that their childhood was difficult see above.  Patient described their current relationships with family of origin as close.      The patient describes their cultural background as [facility].  Cultural influences and impact on patient's life structure, values, norms, and healthcare: none noted.  Patient identified their preferred language to be English. Patient reported they does not need the assistance of an  or other support involved in therapy.     Patient reported experienced significant delays in developmental tasks, such as talking, did not talk until age 4..   Patient's highest education level was associate degree / vocational certificate. Patient identified the following learning problems: attention, concentration, reading and writing.  Modifications will not be used to assist communication in therapy.   Patient reports they are able to understand written materials.      Patient reported the following relationship history 2 long term relationships.  Patient's current relationship status is partnered / significant other for 13 months.   Patient identified their sexual  "orientation as heterosexual.  Patient reported having zero child(chloe). Patient identified partner, mother and friends as part of their support system.  Patient identified the quality of these relationships as good.      Patient's current living/housing situation involves staying in own home/apartment.  They live with s/o and they report that housing is stable.     Patient is currently employed part time and reports they are able to function appropriately at work..  Patient reports their finances are obtained through employment.  Patient does not identify finances as a current stressor.      Patient reported that they have not been involved with the legal system.  Got caught stealing in HS and did community service.    ADHD Symptom History  Client's highest education level was associate degree / vocational certificate. During the elementary, middle, and high school years, patient recalls academic strengths in the area of math and \"hands on\" activities. Client reported experiencing academic problems in reading, writing and social studies. Client did identify the following learning problems: attention, concentration, reading and writing. Client did receive tutoring services during the school years. Client did receive special education services. Client reported significant behavior and discipline problems including: disruptive classroom behavior and frequent tardiness or absences. Client did not attend post secondary school.   Client reported difficulty with childhood peer relationships.As a child, client reported that he failed to complete assigned chores in the home environment, had problems getting ready for school in the morning, had problems with organization and keeping track of items, forgot school work or other items between home and school, needed frequent reminders by parents to be motivated or to complete work, displayed argumentative or oppositional behaviors, had problems managing temper with frequent " emotional outbursts, caused damage to property and had difficulty managing personal hygiene.   Client reported that he is currently employed. Client reported that the current job is a good fit for his skills and personality.  Client reported that he frequently made mistakes with poor attention to detail, often felt bored, often been late in completing projects, disorganized behavior, distractible behavior and problems learning new materials .  The client's work history includes: Janitorial, mechanical, electrical maintenance.  The longest period of employment has been 6 years.  Client has been terminated from a place of employment. As a child, client reported having sleep disturbance, including: frequent dreams / nightmares and restless legs syndrome .  Client reported currently experiencing regular and consistent sleep patterns.  Client reported sleeping approximately 7-9 hours per night.  Client reported that he has not completed a sleep study.  Client reported having an inconsistent diet.  There are not significant nutritional concerns.  Client reported no current exercise routine.    Patient has received a 's license.  Patient has not received any moving violations.  Patient reported the following driving habits: experiencces road rage and gets lost easily.  According to client, other people are comfortable riding as passengers when he is driving.     Patient's Strengths and Limitations:  Patient identified the following strengths or resources that will help them succeed in treatment: friends / good social support and family support. Things that may interfere with the patient's success in treatment include: none identified.     Personal and Family Medical History:  Patient   report a family history of mental health concerns.  Patient reports family history includes Cancer in his brother; Coronary Artery Disease in his maternal grandmother; Diabetes in his father and paternal grandmother; Heart Disease in  his maternal grandmother..     Patient does report Mental Health Diagnosis and/or Treatment.  Patient Patient reported the following previous diagnoses which include(s): ADHD and Depression.  Patient reported symptoms began in childhood.   Patient has received mental health services in the past: psychiatry as a child. Currently sees Samara and  .  Psychiatric Hospitalizations: [facility] 10 years ago.  Patient denies a history of civil commitment.  Patient is receiving other mental health services.  These include psychiatry, see above.         Patient has had a physical exam to rule out medical causes for current symptoms.  Date of last physical exam was within the past year. Client was encouraged to follow up with PCP if symptoms were to develop. The patient has a [facility] Provider, who is named Tc Xiong (Inactive)..  Patient reports the following current medical concerns: Diabetes Type II .  Patient denies any issues with pain..   There are not significant appetite / nutritional concerns / weight changes.   Patient does not report a history of head injury / trauma / cognitive impairment.      Patient reports current meds as:   No outpatient medications have been marked as taking for the 1/31/22 encounter (Appointment) with Soy Castano       Medication Adherence:  Patient reports  .  taking prescribed medications as prescribed.    Patient Allergies:    Allergies   Allergen Reactions     Ragweeds        Medical History:    Past Medical History:   Diagnosis Date     Suicidal ideation 2013         Substance Use:  Patient did not report a family history of substance use concerns; see medical history section for details.  Patient has not received chemical dependency treatment in the past.  Patient has not ever been to detox.      Patient is not currently receiving any chemical dependency treatment.           Substance History of use Age of first use Date of last  use     Pattern and duration of use (include amounts and frequency)   Alcohol  Does not Use       REPORTS SUBSTANCE USE: N/A   Cannabis      Does not Use     REPORTS SUBSTANCE USE: N/A     Amphetamines      Does not Use     REPORTS SUBSTANCE USE: N/A   Cocaine/crack       Does not Use       REPORTS SUBSTANCE USE: N/A   Hallucinogens     Does not Use       REPORTS SUBSTANCE USE: N/A   Inhalants     Does not Use       REPORTS SUBSTANCE USE: N/A   Heroin     Does not Use       REPORTS SUBSTANCE USE: N/A   Other Opiates    Does not Use     REPORTS SUBSTANCE USE: N/A   Benzodiazepine      Does not Use     REPORTS SUBSTANCE USE: N/A   Barbiturates  Does not Use     REPORTS SUBSTANCE USE: N/A   Over the counter meds    Does not Use     REPORTS SUBSTANCE USE: N/A   Caffeine  Uses     Diet Mtn Dew 3-4 20 oz bottles per day   Nicotine     Does not Use     REPORTS SUBSTANCE USE: N/A   Other substances not listed above:  Identify:   Does not Use     REPORTS SUBSTANCE USE: N/A     Patient reported the following problems as a result of their substance use:  .     CAGE- AID:  No flowsheet data found.    Substance Use: No symptoms    Based on the negative CAGE score and clinical interview there  are not indications of drug or alcohol abuse.      Current Mental Status Exam:   Appearance:  Appropriate    Eye Contact:  Good   Psychomotor:  Hyperactive       Gait / station:  no problem  Attitude / Demeanor: Cooperative  Interested  Speech      Rate / Production: Normal/ Responsive      Volume:  Normal  volume      Language:  intact  Mood:   Normal  Affect:   Appropriate    Thought Content: Clear   Thought Process: Coherent       Associations: No loosening of associations  Insight:   Good   Judgment:  Intact   Orientation:  All  Attention/concentration: Good    Rating Scales:    PHQ9:    PHQ-9 SCORE 7/26/2021 9/1/2021 9/29/2021   PHQ-9 Total Score 9 4 2       GAD7:    MAREN-7 SCORE 7/26/2021 9/1/2021   Total Score 12 10     CGI:      First:No data recorded    Most recentNo data recorded      Significant Losses / Trauma / Abuse / Neglect Issues:   Patient   did not serve in the .  There are indications or report of significant loss, trauma, abuse or neglect issues related to: Unstable housing in childhood.  Concerns for possible neglect are not present.     Safety Assessment:   Current Safety Concerns:  Bowman Suicide Severity Rating Scale (Lifetime/Recent)  Bowman Suicide Severity Rating (Lifetime/Recent) 2/1/2022   1. Wish to be Dead (Lifetime) Yes   Wish to be Dead Description (Lifetime) at age 21 homeless with a lot of family issues.voluntarily comitted himself   1. Wish to be Dead (Recent) No   2. Non-Specific Active Suicidal Thoughts (Lifetime) Yes   2. Non-Specific Active Suicidal Thoughts (Recent) No   3. Active Suicidal Ideation with any Methods (Not Plan) Without Intent to Act (Lifetime) No   3. Active Suicidal Ideation with any Methods (Not Plan) Without Intent to Act (Recent) No   Actual Attempt (Lifetime) No   Actual Attempt (Past 3 Months) No   Has subject engaged in non-suicidal self-injurious behavior? (Lifetime) No   Has subject engaged in non-suicidal self-injurious behavior? (Past 3 Months) No     Patient denies current homicidal ideation and behaviors.  Patient denies current self-injurious ideation and behaviors.    Patient denied risk behaviors associated with substance use.  Patient reported gambling associated with mental health symptoms.  Patient reports the following current concerns for their personal safety: None.  Patient reports there   firearms in the house.       The firearms are secured in a locked space.    History of Safety Concerns:  Patient denied a history of homicidal ideation.     Patient denied a history of personal safety concerns.    Patient denied a history of assaultive behaviors.    Patient denied a history of sexual assault behaviors.     Patient denied a history of risk behaviors  associated with substance use.  Patient reported a history of gambling associated with mental health symptoms.  Patient reports the following protective factors:      Risk Plan:  See Recommendations for Safety and Risk Management Plan    Review of Symptoms per patient report:  Depression: No symptoms, Change in energy level and Difficulties concentrating  Fabiola:  No Symptoms  Psychosis: No Symptoms  Anxiety: Excessive worry and worries about asteroids fear of bees Lexapro is helping. Welbutrin last 6 months and briefly as a kid.  Panic:  Shortness of breath  Post Traumatic Stress Disorder:  No Symptoms   Eating Disorder: No Symptoms  ADD / ADHD:  Inattentive, Difficulties listening, Poor task completion, Poor organizational skills, Distractibility, Forgetful and Restlessness/fidgety  Conduct Disorder: No symptoms  Autism Spectrum Disorder: No symptoms  Obsessive Compulsive Disorder: No Symptoms    Patient reports the following compulsive behaviors and treatment history: None reported.      Diagnostic Criteria:   Attention Deficit Hyperactivity Disorder  A) A persistent pattern of inattention and/or hyperactivity-impulsivity that interferes with functioning or development, as characterized by (1) Inattention and/or (2) Hyperactivity and Impulsivity  (1) Inattention: 6 or more of the following symptoms have persisted for at least 6 months to a degree that is inconsistent with developmental level and that negatively impacts directly on social and academic/occupational activities:  - Often fails to give close attention to details or makes careless mistakes in schoolwork, at work, or during other activities  - Often has difficulty sustaining attention in tasks or play activities  - Often does not seem to listen when spoken to directly  - Often does not follow through on instructions and fails to finish schoolwork, chores, or duties in the workplace  - Often has difficulty organizing tasks and activities  - Often avoids,  "dislikes, or is reluctant to engage in tasks that require sustained mental effort  - Often loses things necessary for tasks or activities  - Is often easily distractedby extraneous stimuli  - Is often forgetful in daily activities  (2) Hyeractivity and Impulsivity: 6 or more of the following symptoms have persisted for at least 6 months to a degree that is inconsistent with developmental level and that negatively impacts directly on social and academic/occupational activities:  - Often fidgets with or taps hands or feet or squirms in seat  - Often leaves seat in situations when remaining seated is expected  - Often runs about or climbs in situationswhere it is inappropriate  - Often unable to play or engage in leisure activities quietly  - Is often \"on the go,\" acting as if \"driven by a motor\"  - Often talks excessively  - Often blurts out an answer before a question has been completed  - Often interrupts or intrudes on others  B) Several inattentive or hyperactive-impulsive symptoms were present prior to age 14 years  C) Several inattentive or hyperactive-impulsive symptoms are present in two or more settings  D) There is clear evidence that the symptoms interfere with, or reduce the quality of, social academic, or occupational functioning  E) The Symptoms do not occur exclusively during the course of schizophrenia or another psychotic disorder and are not better explained by another mental disorder    Functional Status:  Patient reports the following functional impairments: academic performance and relationship(s).       Nonprogrammatic care:  Patient is requesting basic services to address current mental health concerns.    Clinical Summary:  1. Reason for assessment: ADHD Evaluation .  2. Psychosocial, Cultural and Contextual Factors: none  .  3. Principal DSM5 Diagnoses  (Sustained by DSM5 Criteria Listed Above):   Attention-Deficit/Hyperactivity Disorder  314.01 (F90.2) Combined presentation.  4. Other " Diagnoses that is relevant to services:   Generalized anxiety disorder. F 41.1  5. Provisional Diagnosis:  None  6. Prognosis: Expect Improvement.  7. Likely consequences of symptoms if not treated: worsening depression and anxiety.  8. Client strengths include:  caring, employed and support of family, friends and providers .     Recommendations:     1. Plan for Safety and Risk Management:   Recommended that patient call 911 or go to the local ED should there be a change in any of these risk factors..          Report to child / adult protection services was NA.         3. Initial Treatment will focus on:    ADHD Testing:  Patient was given self and collaborative rating scales to be completed prior to the next appointment.  Depression and anxiety rating scales were completed.  Copies of previous diagnostic assessment were requested. .     4. Resources/Service Plan:    services are not indicated.   Modifications to assist communication are not indicated.   Additional disability accommodations are not indicated.      5. Collaboration:   Collaboration / coordination of treatment will be initiated with the following support professionals: None.      6.  Referrals:   The following referral(s) will be initiated: N/A      A Release of Information has been obtained for the following:  N?/A     7. BRET:    BRET:  Discussed the general effects of drugs and alcohol on health and well-being. Provider gave patient printed information about the effects of chemical use on their health and well being. Recommendations:  none .     8. Records:   These were not available for review at time of assessment.   Information in this assessment was obtained from the medical record and  provided by patient who is a good historian.    Patient will have open access to their mental health medical record.      Provider Name/ Credentials:  Soy Castano PhD, LP January 31, 2022

## 2022-02-01 ASSESSMENT — COLUMBIA-SUICIDE SEVERITY RATING SCALE - C-SSRS
ATTEMPT LIFETIME: NO
1. IN THE PAST MONTH, HAVE YOU WISHED YOU WERE DEAD OR WISHED YOU COULD GO TO SLEEP AND NOT WAKE UP?: YES
ATTEMPT PAST THREE MONTHS: NO
2. HAVE YOU ACTUALLY HAD ANY THOUGHTS OF KILLING YOURSELF LIFETIME?: YES
2. HAVE YOU ACTUALLY HAD ANY THOUGHTS OF KILLING YOURSELF?: NO
3. HAVE YOU BEEN THINKING ABOUT HOW YOU MIGHT KILL YOURSELF?: NO
1. IN THE PAST MONTH, HAVE YOU WISHED YOU WERE DEAD OR WISHED YOU COULD GO TO SLEEP AND NOT WAKE UP?: NO

## 2022-02-09 ENCOUNTER — ALLIED HEALTH/NURSE VISIT (OUTPATIENT)
Dept: EDUCATION SERVICES | Facility: CLINIC | Age: 32
End: 2022-02-09
Payer: COMMERCIAL

## 2022-02-09 VITALS — BODY MASS INDEX: 35.77 KG/M2 | WEIGHT: 284.3 LBS

## 2022-02-09 DIAGNOSIS — E11.65 TYPE 2 DIABETES MELLITUS WITH HYPERGLYCEMIA, WITHOUT LONG-TERM CURRENT USE OF INSULIN (H): Primary | ICD-10-CM

## 2022-02-09 DIAGNOSIS — E11.65 TYPE 2 DIABETES MELLITUS WITH HYPERGLYCEMIA, WITHOUT LONG-TERM CURRENT USE OF INSULIN (H): ICD-10-CM

## 2022-02-09 LAB — HBA1C MFR BLD: 9.4 % (ref 0–5.6)

## 2022-02-09 PROCEDURE — G0108 DIAB MANAGE TRN  PER INDIV: HCPCS | Mod: AE

## 2022-02-09 PROCEDURE — 83036 HEMOGLOBIN GLYCOSYLATED A1C: CPT

## 2022-02-09 PROCEDURE — 36415 COLL VENOUS BLD VENIPUNCTURE: CPT

## 2022-02-09 RX ORDER — DULAGLUTIDE 1.5 MG/.5ML
1.5 INJECTION, SOLUTION SUBCUTANEOUS
Qty: 6 ML | Refills: 1 | Status: SHIPPED | OUTPATIENT
Start: 2022-02-09 | End: 2022-08-09

## 2022-02-09 NOTE — PATIENT INSTRUCTIONS
1. Eat 3 balanced meals each day - Monitor carb intake and limit to 60 grams per meal  This would be equal to 4 choices ~  1 choice = 15 grams    Do not wait longer than 4-5 hours to eat something  Snacks limit to no more than 30 grams of carbohydrates or 2 choices  Make sure you include protein source with each meal and at bedtime - this has been shown to help with blood glucose elevations    CUT OUT THE CRAP FOODS !!!!     Protein at bedtime OR if you wake up in the middle of the night - have string cheese or a cold hot dog - or nuts - stay away from shayla crackers     2. Check blood sugars several  times each day using the reader   Fasting and before meal target is 80 - 130   2 hours after a meal target is < 180  remember to bring meter and log book to all appointments    3. Incorporate 30 minutes activity into each day - does not need to be all at one time & walking counts    4. Take diabetes medications as prescribed Continue Metformin 2000 mg daily, stop Glipizide - restart Jardiance 25 mg daily and restart Trulicity 1.5 mg weekly    If you run into any problems with the Trulicity getting refilled - let me know ASAP     I will follow up with you in 1 week through My chart so be watching in your email for the message     Message me today after you get your Trulicity    Normal

## 2022-02-09 NOTE — PROGRESS NOTES
"Diabetes Self-Management Education & Support    Presents for: Individual review    Type of Visit: In Person    How would patient like to obtain AVS? Chris Astorga was provided a copy of AVS at conclusion of todays visit.    ASSESSMENT:    Alberto is a 31-year-old male who comes to clinic today for an office visit to review his blood glucose numbers, check A1c and assist him with his diabetes self-management skills with an A1c not at ADA goal.  Alberto arrived unaccompanied and was wearing his kory CGM today.  He did report he has not been wearing it lately with no reasoning as to why which explains there being no data from him on the portal to review.  My last in clinic visit with Alberto was on 12/7/21 and unfortunately he has failed to follow-up with me either in clinic or via My Chart since then.  He did inform me today he will be changing jobs soon and wanted to schedule an \"emergency visit\" before he lost his insurance from his current job.  He will not regain insurance for 60 days after starting his new job which will roughly be in the beginning of May.  Alberto continues to be followed by psychology and recently went through testing for his ADHD.   He continues to live in an apartment with his girlfriend and says things with her going well - she has been very supportive.  During our conversation today Alberto genuinely seemed concerned with his current blood glucose readings and said he was ready to make changes for improvement.  While reviewing his medications today Alberto informed me that he had not been taking Trulicity for approximately 2 to 3 weeks because was told by his pharmacy that need to be approved by his physician prior to refilling.  Contacted Dr. Cochran and she was unaware that this had even occurred.  Spoke with her and she was all for resuming the Trulicity today.  Because of his labs and insurance will place the order for 3 months of Trulicity with his pharmacy.  He will continue the Metformin and Jardiance " and will have him stop the glipizide for now until we have an opportunity to review his blood glucose numbers in the next couple of weeks.  By her to today's visit Alberto's most recent labs as well as office visit notes were reviewed.    Is very happy and somewhat surprised at Alberto's A1c result today-was actually lower than previous.      Patient's most recent   Lab Results   Component Value Date    A1C 9.4 02/09/2022    is not meeting goal of <7.0    Diabetes knowledge and skills assessment:     Continue education with the following diabetes management concepts: Healthy Eating, Being Active, Monitoring, Taking Medication, Problem Solving, Reducing Risks and Healthy Coping    Based on learning assessment above, most appropriate setting for further diabetes education would be: Individual setting.    INTERVENTIONS:    Education provided today on:  AADE Self-Care Behaviors:  Healthy Eating: consistency in amount, composition, and timing of food intake, weight reduction, eating out and portion control  Being Active: relationship to blood glucose and describe appropriate activity program  Monitoring: purpose, individual blood glucose targets and frequency of monitoring  Taking Medication: action of prescribed medication, proper site selection and rotation for injections and when to take medications  Problem Solving: high blood glucose - causes, signs/symptoms, treatment and prevention, low blood glucose - causes, signs/symptoms, treatment and prevention and carrying a carbohydrate source at all times  Reducing Risks: major complications of diabetes, prevention, early diagnostic measures and treatment of complications and A1C - goals, relating to blood glucose levels, how often to check  Healthy Coping: recognize feelings about diagnosis, benefits of making appropriate lifestyle changes, utilize support systems and when to seek professional counseling    Opportunities for ongoing education and support in diabetes-self  management were discussed. Pt verbalized understanding of concepts discussed and recommendations provided today.       Education Materials Provided:  No new materials provided today      Goals Addressed as of 2/9/2022 at 12:05 PM                    Today    11/23/21       Monitoring (pt-stated)   0%  80%    Added 9/14/21 by Renita Verdugo, RN      I will monitor my blood glucose at least 3 times each day using the CGM that was provided a sample during today's visit 9/14/2021           Reduce sugar intake per day   Not on track      Added 9/14/21 by Renita Verdugo, JULIAN      Substitute non sugary items such as peanut butter or cheese sticks for sugary items (candy) when snacking 9/14/2021            PLAN  Monitor blood glucose several times daily using the kory CGM system.  Informed Alberto if he has difficulty obtaining new sensors with a change in his in insurance I informed him the out-of-pocket cost should be around $75 and if he has difficulty to contact me and let me know.  Nutrition: Eat 3 balanced meals daily, following parameters set by ADA guidelines for intake of carbohydrates for both meals and snacks.  Remember to include a protein with each meal and at bedtime.  Cut out the junk food.  Medications: Continue metformin 2000 mg p.o. daily, 25 mg Jardiance p.o. daily and will restart him on 1.5 mg of Trulicity weekly.  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  Encouraged him to make sure he is keeping active on his off time-reviewing with him the effect that this can have on helping to manage glucose with goal being 30 minutes of moderate exercise/activity each day    Topics to cover at upcoming visits: Healthy Eating, Being Active, Monitoring, Taking Medication, Problem Solving, Reducing Risks and Healthy Coping    Follow-up: weekly via My Chart and will continue to  "monitor his blood glucose via the [employer] portal.    See Goals Section for co-developed, patient-stated behavior change goals.  AVS provided to patient today.          SUBJECTIVE / OBJECTIVE:  Presents for: Individual review  Accompanied by: Self  Diabetes education in the past 24 mo: Yes  Focus of Visit: Monitoring,Reducing Risks,Taking Medication,Healthy Coping,Healthy Eating,Problem Solving,Being Active,Self-care behavioral goal setting,Assistance w/ making life changes  Diabetes type: Type 2  Disease course: Getting harder to manage  How confident are you filling out medical forms by yourself:: A little bit  Transportation concerns: No  Difficulty affording diabetes medication?: No  Difficulty affording diabetes testing supplies?: No  Other concerns:: Lack of coping (barriers - ADHD & anxiety)  Cultural Influences/Ethnic Background:  Not  or       Diabetes Symptoms & Complications:  Fatigue: Yes  Neuropathy: Sometimes  Polyuria: Sometimes  Complications assessed today?: No    Patient Problem List and Family Medical History reviewed for relevant medical history, current medical status, and diabetes risk factors.    Vitals:  Wt 129 kg (284 lb 4.8 oz)   BMI 35.77 kg/m    Estimated body mass index is 35.77 kg/m  as calculated from the following:    Height as of 9/29/21: 1.899 m (6' 2.75\").    Weight as of this encounter: 129 kg (284 lb 4.8 oz).   Last 3 BP:   BP Readings from Last 3 Encounters:   01/07/22 135/81   09/29/21 130/84   08/30/21 124/84       History   Smoking Status     Never Smoker   Smokeless Tobacco     Never Used       Labs:  Lab Results   Component Value Date    A1C 9.4 02/09/2022     Lab Results   Component Value Date     08/30/2021     Lab Results   Component Value Date    LDL 88 08/30/2021     Direct Measure HDL   Date Value Ref Range Status   08/30/2021 37 (L) >=40 mg/dL Final     Comment:     HDL Cholesterol Reference Range:     0-2 years:   No reference ranges " established for patients under 2 years old  [facility] for lipid analytes.    2-8 years:  Greater than 45 mg/dL     18 years and older:   Female: Greater than or equal to 50 mg/dL   Male:   Greater than or equal to 40 mg/dL   ]  GFR Estimate   Date Value Ref Range Status   08/30/2021 >90 >60 mL/min/1.73m2 Final     Comment:     As of July 11, 2021, eGFR is calculated by the CKD-EPI creatinine equation, without race adjustment. eGFR can be influenced by muscle mass, exercise, and diet. The reported eGFR is an estimation only and is only applicable if the renal function is stable.   11/16/2020 >60 >60 mL/min/1.73m2 Final     GFR Estimate If Black   Date Value Ref Range Status   11/16/2020 >60 >60 mL/min/1.73m2 Final     Lab Results   Component Value Date    CR 0.78 08/30/2021     No results found for: MICROALBUMIN    Healthy Eating:  Healthy Eating Assessed Today: Yes  Cultural/Presybeterian diet restrictions?: No  Meal planning/habits: None  How many times a week on average do you eat food made away from home (restaurant/take-out)?: 3  Meals include: Breakfast,Lunch,Dinner,Other  Breakfast: Breakfast cereal (-shredded mini wheats) or a couple of hot dogs with chips and dip  Lunch: Currently this meal for Alberto is eating out and is fast food-might be a chicken terrazas ranch wrap or a couple Jesse loppers for a couple double cheeseburgers with chocolate shake  Dinner: As he is eating more at home-some of his most recent meals have been hotdogs/tacos/meat loaf/hamburger  Snacks: Says he does not snack much during the day but will frequently wake up in the middle of the night and have shayla crackers with a glass of milk-this can happen 2-3 times a week  Other: Informed Alberto today that in order for us to get his diabetes and blood glucose under better control he is going to have to eliminate certain things from his diet for the time being such as chips and dip and milkshakes.  Beverages: Water,Diet  soda,Milk,Energy drinks  Has patient met with a dietitian in the past?: No    Being Active:  Being Active Assessed Today: Yes  Exercise:: Currently not exercising  Barrier to exercise: None    Monitoring:  Monitoring Assessed Today: Yes  Did patient bring glucose meter to appointment? : Yes  Blood Glucose Meter: CGM  Times checking blood sugar at home (number): 3  Times checking blood sugar at home (per): Day  Blood glucose trend: Increasing    Glucose data:          Taking Medications:  Diabetes Medication(s)     Biguanides       metFORMIN (GLUCOPHAGE) 1000 MG tablet    Take 2,000 mg by mouth daily (with breakfast)    Sodium-Glucose Co-Transporter 2 (SGLT2) Inhibitors       JARDIANCE 25 MG TABS tablet    Take 1 tablet by mouth daily     Patient not taking: Reported on 1/7/2022    Sulfonylureas       glipiZIDE (GLUCOTROL XL) 5 MG 24 hr tablet    Take 2 tablets (10 mg) by mouth daily    Incretin Mimetic Agents (GLP-1 Receptor Agonists)       dulaglutide (TRULICITY) 1.5 MG/0.5ML pen    Inject 1.5 mg Subcutaneous every 7 days          Current Treatments: Oral Medication (taken by mouth)  Problems taking diabetes medications regularly?: Yes  Diabetes medication side effects?: No    Problem Solving:  Problem Solving Assessed Today: Yes  Is the patient at risk for hypoglycemia?: No  Is the patient at risk for DKA?: Yes              Reducing Risks:  Reducing Risks Assessed Today: Yes  Diabetes Risks: Sedentary Lifestyle,Hyperlipidemia,Family History  CAD Risks: Diabetes Mellitus,Dyslipidemia,Family history,Male sex,Obesity,Sedentary lifestyle  Feet checked by healthcare provider in the last year?: Yes    Healthy Coping:  Healthy Coping Assessed Today: Yes  Emotional response to diabetes: Concern for health and well-being,Ready to learn  Informal Support system:: Significant other  Stage of change: PREPARATION (Decided to change - considering how)  Support resources: In-person Offerings  Patient Activation Measure Survey  Score:  No flowsheet data found.          Thank you,  Renita Beck RN [facility]  Certified Diabetes Care and   Visit type : DSMT      Time Spent: 60 minutes  Encounter Type: Individual        Any diabetes medication dose changes were made via the Certified Diabetes Care & Education Protocol in collaboration with the patient's referring provider and primary care provider. A copy of this encounter was shared with the provider.    Much or all of the text in this note was generated through the use of the Dragon Dictate voice-to-text software.Errors in spelling or words which seem out of context are unintentional. Sound alike errors, in particular, may have escaped editing.

## 2022-03-31 ENCOUNTER — VIRTUAL VISIT (OUTPATIENT)
Dept: PSYCHOLOGY | Facility: CLINIC | Age: 32
End: 2022-03-31

## 2022-03-31 DIAGNOSIS — F90.2 ADHD (ATTENTION DEFICIT HYPERACTIVITY DISORDER), COMBINED TYPE: Primary | ICD-10-CM

## 2022-03-31 PROCEDURE — 90834 PSYTX W PT 45 MINUTES: CPT | Mod: 95 | Performed by: PSYCHOLOGIST

## 2022-03-31 PROCEDURE — 96131 PSYCL TST EVAL PHYS/QHP EA: CPT | Mod: 95 | Performed by: PSYCHOLOGIST

## 2022-03-31 PROCEDURE — 96130 PSYCL TST EVAL PHYS/QHP 1ST: CPT | Mod: 95 | Performed by: PSYCHOLOGIST

## 2022-04-01 ENCOUNTER — FCC EXTENDED DOCUMENTATION (OUTPATIENT)
Dept: PSYCHOLOGY | Facility: CLINIC | Age: 32
End: 2022-04-01
Payer: MEDICAID

## 2022-04-01 NOTE — PROGRESS NOTES
Progress Note  Disclaimer: Voice recognition software was used to generate this note. As a result, wrong word or 'sound-a-like' substitutions may have occurred due to the inherent limitations of voice recognition software. There may be errors in the script that have gone undetected. Please consider this when interpreting information found in this chart.       Client Name: Chris Astorga  Date: 3/31/2022      Service Type: Individual      Session Start Time: 2:00 PM  session End Time: 2:45 PM     Session Length: 45    Session #: 3    Attendees: Client attended alone    Service Modality:  Video Visit:      Provider verified identity through the following two step process.  Patient provided:  Patient is known previously to provider    Telemedicine Visit: The patient's condition can be safely assessed and treated via synchronous audio and visual telemedicine encounter.      Reason for Telemedicine Visit: Services only offered telehealth    Originating Site (Patient Location): Patient's home    Distant Site (Provider Location): Provider Remote Setting- Home Office    Consent:  The patient/guardian has verbally consented to: the potential risks and benefits of telemedicine (video visit) versus in person care; bill my insurance or make self-payment for services provided; and responsibility for payment of non-covered services.     Patient would like the video invitation sent by:  My Chart    Mode of Communication:  Video Conference via [employer]    As the provider I attest to compliance with applicable laws and regulations related to telemedicine.     Treatment Plan Last Reviewed: Today  PHQ-9 / MAREN-7 : See Flowsheets    DATA  Interactive Complexity: NO  Crisis: NO            DATA   ADHD Assessment feedback session. Reviewed results of testing. See [facility] extended documentation for results. Explained diagnosis and treatment options. Recommended medication evaluation be scheduled  with PCP. Also provided and reviewed ADHD patient handout. Pt will schedule follow-up with me after seeing PCP.     Progress Since Last Session (Related to Symptoms / Goals / Homework):   Symptoms: No change stable    Homework: Achieved / completed to satisfaction      Episode of Care Goals: Satisfactory progress - ACTION (Actively working towards change); Intervened by reinforcing change plan / affirming steps taken     Current / Ongoing Stressors and Concerns:   Difficulty with attention and concentration     Treatment Objective(s) Addressed in This Session:   Reviewed results of testing, provided ADHD Psychoeducation tips and resources, encouraged client to make appointment for medication evaluation.       Intervention:   psychoeducation regarding ADHD        ASSESSMENT: Current Emotional / Mental Status (status of significant symptoms):   Risk status (Self / Other harm or suicidal ideation)   Client denies current fears or concerns for personal safety.   Client denies current or recent suicidal ideation or behaviors.   Client denies current or recent homicidal ideation or behaviors.   Client denies current or recent self injurious behavior or ideation.   Client denies other safety concerns.   Recommended that patient call 911 or go to the local ED should there be a change in any of these risk factors.     Appearance:   Appropriate    Eye Contact:   Good    Psychomotor Behavior: Normal    Attitude:   Cooperative    Orientation:   All   Speech    Rate / Production: Normal     Volume:  Normal    Mood:    Normal   Affect:    Appropriate    Thought Content:  Clear    Thought Form:  Coherent  Logical    Insight:    Good      Medication Review:   No changes to current psychiatric medication(s)     Medication Compliance:   Yes     Changes in Health Issues:   None reported     Chemical Use Review:   Substance Use: Chemical use reviewed, no active concerns identified      Tobacco Use: No current tobacco use.        Collateral Reports Completed:   Not Applicable    PLAN: (Client Tasks / Therapist Tasks / Other)  See [facility] extended documentation for testing report.  Client was given ADHD tips and tricks handout as well as an annotated reading list of useful books and website.  He is welcome to contact me at any time for consultation.        Soy Castano    Psychological Testing   Billing/Services Summary       Initial interview/Record Review 1/19/2022 98421    Intake 1/31/2022 26696    Interactive feedback Session 3/31/2022 59797    Testing Evaluation Services Base: 23251  (1st 60 mins) Add-on: 12257  (each addtl 60 mins)   Test Scoring and Interpretation  (Start/Stop), (Date, Day #) 60 minutes   Integration/Report Generation   (Start/Stop), (Date, Day #) 60 minutes   Clinical Decision Making/Battery Modification   (Start/Stop), (Date, Day #) 0 minutes   Post-Service Work   (Start/Stop), (Date, Day #) 0 minutes   Total Time: 120 minutes (2 hours, 00 minutes)   Total Units:              Diagnosis(es): (ICD-10)  F 90.2

## 2022-04-01 NOTE — PROGRESS NOTES
[facility]  ADHD Evaluation    This note consists of symbols derived from keyboarding, dictation and/or voice recognition software. As a result, there may be errors in the script that have gone undetected. Please consider this when interpreting information found in this chart.     Patient: Chris Astorga  YOB: 1990  MRN: [id number]  Date(s) of assessment:  Diagnostic Assessment 1/19/2022 and 1/31/2022, Fatou self-report and collateral measures scored and interpreted 3/31/2022 and Feedback session 3/31/2022    Information about appointment:  Client attended three sessions to aid in determining client's mental health diagnosis or diagnoses and treatment recommendations that best address client concerns. Client records includingmedical were reviewed. A diagnostic assessment was conducted at the initial appointment. Client completed several rating scales to assist in assessing attention-related and other mental health symptoms that may be causing impairments in functioning. Rating scales were also completed by a collateral contact.    Assessment tools:      Fatou Adult ADHD Rating Scale-IV: Self and Other Reports (BAARS-IV), Fatou Functional Impairment Scale: Self and Other Reports (BFIS), Fatou Deficits in Executive Functioning Scale: Self and Other Reports (BDEFS), Patient Health Questionnaire-9 (PHQ-9) and Generalized Anxiety Disorder-7 (MAREN-7)    Assessment Results:    Client reports:  Diagnosed with ADHD as a kid at age 7 . On meds he was able to study, off no motivation, bored easily. Can get job advancement by studying but doesn't want to . 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 Has tried to take self-study course 4 times and failed test 4 times. Major problems with taking tests. Had accommodations in HS and middle school. Tests readers, quiet room for taking tests. Procrastination. Potential talks every other year,  especially if he wasn't interested. Currently symptoms primarily show up at home, organized chaos. Picking up stuff, leaves pop cans everywhere. Has been able to make changes since frank moved in. Same thing with laundry.  Mother and sister diagnosed with ADHD. Mom also depression and high anxiety. Sister anxiety and previous diagnosis of bipolar. He was diagnosed with ODD as a kid. Half brother ADHD. Half sister possibly as well.   Hyperfocuses on phone, reading comic books.      No alcohol or drug problems he knows of. Sister and cousin both use a lot of cannabis. Rare alcohol use  Possibly 3 over weekend. Diet Mtn Dew 12-36 per week.  Withdrawal headaches.  Lots of junk food. Nonsmoker.      ODD as a kid, major problems with authority. Always did what he was told he couldn't do, something he automatically did. Homeless during Sr. Year in . Some umanzor theft kleptomania when he was not homeless. Mom lost her job and he and his sister wound up in homeless shelter, stayed with aunt part some time. Mom was relyant on grandmother for some time, grandmother  last year. Mom is employed and doing OK. Dad lives in [place] also a  sees about once per year. Refuses to come to [place]. Father was  in [employer]. Client born in [place] but grew up in [place]. Folks split when he was 4 before he can remember.      Asked to be hospitalised at age 21. Homeless, had a lot going on. Started thinking about OD'ing. Asked to be put on a watch. 72 hour hold no thoughts in 10 years. No legal history.     Fatou Adult ADHD Rating Scale-IV: Self and Other Reports (BAARS-IV)  The BAARS-IV assesses for symptoms of ADHD that are experienced in one's daily life. This assessment measure includes self and collateral rating scales designed to provide information regarding current and childhood symptoms of ADHD including inattention, hyperactivity, and impulsivity. Self-report scores are reported as percentiles. Scores at the 76th-83rd  "percentile are considered marginal, scores at the 84th-92nd percentile are considered borderline, scores at the 93rd-95th percentile are considered mild, scores at the 96th-98th percentile are considered moderate, and those at the 99th percentile are considered severe. Collateral or \"other\" rating scales are reported as number of symptoms observed in comparison to those reported by the client. Norms and percentile scores are not available for collateral reports.     Current Symptoms Scale--Self Report:   Client completed the self-report inventory of current symptoms.     BAARS-IV SR Raw Score %tile     %tile   Inattention Total  28 98  Symptom Count 8 98   Hyperactivity Total 14 96  Symptom Count 3    Impulsivity Total 11 96  Symptom Count 2 97   Sluggish Cog Tempo 20 87  Symptom Count 5 94   Total ADHD Score 53 98  Total ADHD SX Count 13 98           Client indicated that the reported symptoms have resulted in impaired functioning in school, home, work and social relationships. Overall, the results suggest the client is experiencing Moderate ADHD symptoms.     Current Symptoms Scale--Other Report:  Client's significant other completed the collateral report inventory of current symptoms.     BAARS-IV OR  Raw Score     Inattention Total   22 Symptom Count  4   Hyperactivity Total  18 Symptom Count  4   Impulsivity Total  11 Symptom Count  3   Sluggish Cog Tempo  20 Symptom Count  3   Total ADHD Score  51 Total ADHD SX Count  11         The collateral contact indicated the client demonstrates impaired functioning in home and work} The collateral- and self-report scores are not significantly different.     Childhood Symptoms Scale--Self-Report:  Client completed the self-report inventory of childhood symptoms.     BAARS-IV Child SR Raw Score %tile     %tile   Inattention Total  33 99  Symptom Count 9 99   Impulsivity Total 32 99  Symptom Count 9 99   Total Score 65 99        Symptom Count 18 99              Client " "indicated that the reported symptoms resulted in impaired functioning in school, home, work and social relationships Overall, the results suggest the client experienced  Severe symptoms of ADHD as a child.     Childhood Symptoms Scale--Other Report:  Client's mother completed the collateral report inventory of childhood symptoms.     BAARS-IV Child OR Raw Score    Inattention Total   29 Symptom Count 7   Impulsivity Total  27 Symptom Count 8   Total Score  56     Symptom Count  15           Based on the collateral contact's recollection of client's childhood symptoms. The collateral contact indicated the client demonstrates impaired functioning in school, home, work and social relationshipsThe collateral- and self-report scores are not significantly different.                        Fatou Functional Impairment Scale: Self and Other Reports (BFIS)  The BFIS is used to assess an individuals' psychosocial impairment in major life/daily activities that may be due to a mental health disorder. This assessment measure includes self and collateral rating scales. Self-report scores are reported as percentiles. Scores at the 76th-83rd percentile are considered marginal, scores at the 84th-92nd percentile are considered borderline, scores at the 93rd-95th percentile are considered mild, scores at the 96th-98th percentile are considered moderate, and those at the 99th percentile are considered severe.Collateral or \"other\" rating scales are reported as number of symptoms observed in comparison to those reported by the client. Norms and percentile scores are not available for collateral reports.     Results indicate the client identified impairment (scores at or greater than 93rd percentile) in the following areas: home-chores, work, community activities, money management and self-care routines The client's Mean Impairment Score was 5.21 (91st percentile) indicating the client is reporting Borderline impairment in functioning " "across domains. Client's significant other completed the collateral rating scale, which indicated similar results. The collateral contact's scores were generally higher than the client's report.     Fatou Deficits in Executive Functioning Scale (BDEFS)  The BDEFS is a measure used for evaluating dimensions of adult executive functioning in daily life.This assessment measure includes self and collateral rating scales. Self-report scores are reported as percentiles. Scores at the 76th-83rd percentile are considered marginal, scores at the 84th-92nd percentile are considered borderline, scores at the 93rd-95th percentile are considered mild, scores at the 96th-98th percentile are considered moderate, and those at the 99th percentile are considered severe.Collateral or \"other\" rating scales are reported as number of symptoms observed in comparison to those reported by the client. Norms and percentile scores are not available for collateral reports.     BDEFS-LF SR  Raw Score %tile   Time Management Section 1 Total  60 95   Org/problem solving Section 2 Total 63 97   Self Restraint Section 3 Total 55 98   Self Motivation Section 4 Total 37 99   Emotional regulation Section 5 Total 38 96   Total EF Summary Score 253 98   EF Symptom Count  65    ADHD-EF Index Score 35 99         These scores suggest the client has Moderate deficits in executive functioning. These deficits are likely to be due to ADHD.     Client's significant other completed the collateral rating scale, which indicated similar results. The collateral contact's scores were generally lower than the client's report.    Patient Health Questionnaire- 9 (PHQ-9)   This questionnaire is designed to assess for depression in adults.  Based on the score, he is experiencing mild symptoms of depression. Client identified the following symptoms of depression: lack of interest, difficulty with sleep, poor appetite or overeating, poor concentration and restlessness or " lethargy.       Generalized Anxiety Disorder Questionnaire (MAREN-7)  This questionnaire is designed to assess for anxiety in adults.  Based on the score, he is experiencing moderate symptoms of anxiety. Client identified the following symptoms of anxiety: feeling on edge/nervous/anxious, difficulty controlling worry, worrying about many different things, trouble relaxing, being restless, becoming easily annoyed or irritable and feeling something awful might happen      Summary (based on clinical interview, review of records, test results):    Current Symptoms:  Inattention:Moderate   Hyperactivity:Moderate   Childhood Symptoms:Severe   Functional impairment 5/14 Domains. Severity, Borderline  Deficits in Executive Functioning: Moderate   Depression: moderate   Anxiety: mild     ADHD is a neurodevelopmental disorder. As such, to qualify for a diagnosis of ADHD an individual must show some symptoms of the disorder before the age of 12; these symptoms must currently be present to a degree that is inconsistent with their developmental level; the symptoms must negatively impact the individual;  they must be present for more than six months;and  they must occur in multiple areas of the individuals life (e.g. Home and school).     The client has a strong family history of ADHD and previous diagnosis at age 7.  He reported good response to medication through high school as well as effective use of treatment accommodations.  Having been off of medication for 11 years and his current testing is mildly influenced by medication with bupropion for the last year.  He is not currently on stimulant medications.  His testing reveals that numerous symptoms of ADHD still remain.  He is experiencing moderate levels of inattention hyperactivity and impulsivity.  In measures of executive functioning he is experiencing moderate to severe difficulties in all areas measured.  Additionally the confidence or index scale is particularly high.   This typically distinguishes ADHD from other diagnoses with similar symptoms sets.  There is some mild associated depression and moderate anxiety.  Typically these ameliorate as underlying ADHD is addressed.      DSM5 Diagnoses: (Sustained by DSM5 Criteria Listed Above)  Diagnoses: Attention-Deficit/Hyperactivity Disorder  314.01 (F90.2) Combined presentation;  Psychosocial & Contextual Factors: Associated depression and anxiety        Recommendations:    Schedule an appointment with your physician to discuss a medication evaluation., Access resources through websites, books, and articles such as those provided  in the handout., Consider working with an ADHD  or individual therapist to learn skills to  assist with symptom management, as well as ways to improve relationships,  etc that may have been impacted by your symptoms. , Consider attending workshops or support groups through [employer] ([employer]). and Schedule a follow-up appointment with me in about six weeks to review symptoms, treatment involvement, and struggles and/or successes.    Soy Castano

## 2022-05-05 ENCOUNTER — TELEPHONE (OUTPATIENT)
Dept: FAMILY MEDICINE | Facility: CLINIC | Age: 32
End: 2022-05-05
Payer: COMMERCIAL

## 2022-05-05 NOTE — TELEPHONE ENCOUNTER
Prior Authorization Request  Who s requesting:  Pharmacy  Pharmacy Name and Location: [id number]  Medication Name: dulaglutide (TRULICITY) 1.5 MG/0.5ML pen  Insurance Plan: [employer]  Insurance Member ID Number:  [id number]  CoverMyMeds Key: [id number]  Informed patient that prior authorizations can take up to 10 business days for response:   Yes  Okay to leave a detailed message: YES

## 2022-05-10 NOTE — TELEPHONE ENCOUNTER
Central Prior Authorization Team   Phone: [phone]    PA Initiation    Medication: Trulicity 1.5MG/0.5ML pen-injectors  Insurance Company: [employer] [place] - Phone [phone] Fax [phone]  Pharmacy Filling the Rx: [id number] - [place], [place] - [address] [place] [address] E  Filling Pharmacy Phone: [phone]  Filling Pharmacy Fax:    Start Date: 5/10/2022

## 2022-05-12 ENCOUNTER — TELEPHONE (OUTPATIENT)
Dept: FAMILY MEDICINE | Facility: CLINIC | Age: 32
End: 2022-05-12

## 2022-05-12 ENCOUNTER — OFFICE VISIT (OUTPATIENT)
Dept: FAMILY MEDICINE | Facility: CLINIC | Age: 32
End: 2022-05-12
Payer: COMMERCIAL

## 2022-05-12 VITALS
HEART RATE: 80 BPM | WEIGHT: 278.2 LBS | SYSTOLIC BLOOD PRESSURE: 127 MMHG | DIASTOLIC BLOOD PRESSURE: 73 MMHG | RESPIRATION RATE: 16 BRPM | BODY MASS INDEX: 35.01 KG/M2

## 2022-05-12 DIAGNOSIS — Z11.4 SCREENING FOR HIV (HUMAN IMMUNODEFICIENCY VIRUS): ICD-10-CM

## 2022-05-12 DIAGNOSIS — Z11.59 NEED FOR HEPATITIS C SCREENING TEST: ICD-10-CM

## 2022-05-12 DIAGNOSIS — E11.65 TYPE 2 DIABETES MELLITUS WITH HYPERGLYCEMIA, WITHOUT LONG-TERM CURRENT USE OF INSULIN (H): Primary | ICD-10-CM

## 2022-05-12 DIAGNOSIS — E66.01 MORBID OBESITY (H): ICD-10-CM

## 2022-05-12 DIAGNOSIS — Z78.9 HEPATITIS B VACCINATION STATUS UNKNOWN: ICD-10-CM

## 2022-05-12 DIAGNOSIS — E78.2 MIXED HYPERLIPIDEMIA: ICD-10-CM

## 2022-05-12 DIAGNOSIS — F90.2 ATTENTION DEFICIT HYPERACTIVITY DISORDER (ADHD), COMBINED TYPE: ICD-10-CM

## 2022-05-12 LAB
CREAT UR-MCNC: 142 MG/DL
HBA1C MFR BLD: 9.3 % (ref 0–5.6)
HBV SURFACE AB SERPLBLD QL IA.RAPID: NEGATIVE
HIV 1+2 AB+HIV1 P24 AG SERPL QL IA: NEGATIVE
MICROALBUMIN UR-MCNC: 1.19 MG/DL (ref 0–1.99)
MICROALBUMIN/CREAT UR: 8.4 MG/G CR

## 2022-05-12 PROCEDURE — 87389 HIV-1 AG W/HIV-1&-2 AB AG IA: CPT | Performed by: FAMILY MEDICINE

## 2022-05-12 PROCEDURE — 86803 HEPATITIS C AB TEST: CPT | Performed by: FAMILY MEDICINE

## 2022-05-12 PROCEDURE — 82043 UR ALBUMIN QUANTITATIVE: CPT | Performed by: FAMILY MEDICINE

## 2022-05-12 PROCEDURE — 99214 OFFICE O/P EST MOD 30 MIN: CPT | Performed by: FAMILY MEDICINE

## 2022-05-12 PROCEDURE — 83036 HEMOGLOBIN GLYCOSYLATED A1C: CPT | Performed by: FAMILY MEDICINE

## 2022-05-12 PROCEDURE — 86706 HEP B SURFACE ANTIBODY: CPT | Performed by: FAMILY MEDICINE

## 2022-05-12 PROCEDURE — 36415 COLL VENOUS BLD VENIPUNCTURE: CPT | Performed by: FAMILY MEDICINE

## 2022-05-12 RX ORDER — DULAGLUTIDE 1.5 MG/.5ML
1.5 INJECTION, SOLUTION SUBCUTANEOUS
Qty: 6 ML | Refills: 1 | Status: CANCELLED | OUTPATIENT
Start: 2022-05-12

## 2022-05-12 ASSESSMENT — ANXIETY QUESTIONNAIRES
6. BECOMING EASILY ANNOYED OR IRRITABLE: NOT AT ALL
GAD7 TOTAL SCORE: 5
7. FEELING AFRAID AS IF SOMETHING AWFUL MIGHT HAPPEN: SEVERAL DAYS
GAD7 TOTAL SCORE: 5
4. TROUBLE RELAXING: NOT AT ALL
3. WORRYING TOO MUCH ABOUT DIFFERENT THINGS: SEVERAL DAYS
GAD7 TOTAL SCORE: 5
1. FEELING NERVOUS, ANXIOUS, OR ON EDGE: SEVERAL DAYS
8. IF YOU CHECKED OFF ANY PROBLEMS, HOW DIFFICULT HAVE THESE MADE IT FOR YOU TO DO YOUR WORK, TAKE CARE OF THINGS AT HOME, OR GET ALONG WITH OTHER PEOPLE?: SOMEWHAT DIFFICULT
2. NOT BEING ABLE TO STOP OR CONTROL WORRYING: SEVERAL DAYS
5. BEING SO RESTLESS THAT IT IS HARD TO SIT STILL: SEVERAL DAYS
7. FEELING AFRAID AS IF SOMETHING AWFUL MIGHT HAPPEN: SEVERAL DAYS

## 2022-05-12 ASSESSMENT — ASTHMA QUESTIONNAIRES
QUESTION_2 LAST FOUR WEEKS HOW OFTEN HAVE YOU HAD SHORTNESS OF BREATH: NOT AT ALL
ACT_TOTALSCORE: 25
QUESTION_5 LAST FOUR WEEKS HOW WOULD YOU RATE YOUR ASTHMA CONTROL: COMPLETELY CONTROLLED
QUESTION_1 LAST FOUR WEEKS HOW MUCH OF THE TIME DID YOUR ASTHMA KEEP YOU FROM GETTING AS MUCH DONE AT WORK, SCHOOL OR AT HOME: NONE OF THE TIME
ACT_TOTALSCORE: 25
QUESTION_4 LAST FOUR WEEKS HOW OFTEN HAVE YOU USED YOUR RESCUE INHALER OR NEBULIZER MEDICATION (SUCH AS ALBUTEROL): NOT AT ALL
QUESTION_3 LAST FOUR WEEKS HOW OFTEN DID YOUR ASTHMA SYMPTOMS (WHEEZING, COUGHING, SHORTNESS OF BREATH, CHEST TIGHTNESS OR PAIN) WAKE YOU UP AT NIGHT OR EARLIER THAN USUAL IN THE MORNING: NOT AT ALL

## 2022-05-12 ASSESSMENT — PATIENT HEALTH QUESTIONNAIRE - PHQ9
SUM OF ALL RESPONSES TO PHQ QUESTIONS 1-9: 1
SUM OF ALL RESPONSES TO PHQ QUESTIONS 1-9: 1
10. IF YOU CHECKED OFF ANY PROBLEMS, HOW DIFFICULT HAVE THESE PROBLEMS MADE IT FOR YOU TO DO YOUR WORK, TAKE CARE OF THINGS AT HOME, OR GET ALONG WITH OTHER PEOPLE: NOT DIFFICULT AT ALL

## 2022-05-12 NOTE — TELEPHONE ENCOUNTER
Prior Authorization Approval    Authorization Effective Date: 5/10/2022  Authorization Expiration Date: 5/10/2023  Medication: Trulicity 1.5MG/0.5ML pen-injectors  Approved Dose/Quantity:    Reference #:     Insurance Company: [employer] [place] - Phone [phone] Fax [phone]  Expected CoPay:       CoPay Card Available:      Foundation Assistance Needed:    Which Pharmacy is filling the prescription (Not needed for infusion/clinic administered): [id number] - [place], [place] - [address]  Pharmacy Notified: Yes  Patient Notified: Yes

## 2022-05-12 NOTE — TELEPHONE ENCOUNTER
Central Prior Authorization Team - Phone: [phone]     Duplicate encounter. PA approved 5/12/2022

## 2022-05-12 NOTE — PROGRESS NOTES
"  Assessment & Plan     Type 2 diabetes mellitus with hyperglycemia, without long-term current use of insulin (H)  Remains poorly controlled.  Off Trulicity now for about 3 months due to insurance coverage issues.  Will initiate a prior authorization as soon as possible.  He continues on metformin, Jardiance, and [facility]  He felt that his blood sugars were much better on the Trulicity.  Recommended sending some sugars to diabetic education or setting up another visit once he resumes the Trulicity.  - Albumin Random Urine Quantitative with Creat Ratio  - Hemoglobin A1c    Morbid obesity (H)  Discussed a healthy, vegetable rich diet and regular cardiovascular exercise.  Patient seems precontemplative about lifestyle change at present.    Hepatitis B vaccination status unknown  Hepatitis B antibody added to labs from today as patient is unaware of his previous vaccine status.  - Hepatitis B Surface Antibody    Attention deficit hyperactivity disorder (ADHD), combined type  Patient follows with psychiatry and recently had a formal diagnosis of ADHD.  He has an appointment tomorrow.    Mixed hyperlipidemia  Patient continues on simvastatin and tolerates this well.    Screening for HIV (human immunodeficiency virus)  HIV screening added to today's labs.  Appears low risk.  - HIV Antigen Antibody Combo    Need for hepatitis C screening test  Hepatitis C screening added to today's labs.  Appears low risk.  - Hepatitis C Screen Reflex to HCV RNA Quant and Genotype             BMI:   Estimated body mass index is 35.01 kg/m  as calculated from the following:    Height as of 9/29/21: 1.899 m (6' 2.75\").    Weight as of this encounter: 126.2 kg (278 lb 3.2 oz).   Weight management plan: Discussed healthy diet and exercise guidelines        Return in about 3 months (around 8/12/2022) for Follow up diabetes.    Miranda Cochran MD  [facility] " FINA Dominguez is a 31 year old who presents for the following health issues     History of Present Illness       Mental Health Follow-up:                    Today's PHQ-9         PHQ-9 Total Score: 1  PHQ-9 Q9 Thoughts of better off dead/self-harm past 2 weeks :   (P) Not at all    How difficult have these problems made it for you to do your work, take care of things at home, or get along with other people: Not difficult at all    Today's MAREN-7 Score: 5    Diabetes:   He presents for follow up of diabetes.  He is checking home blood glucose a few times a month. He checks blood glucose after meals.  Blood glucose is sometimes over 200 and never under 70. He is aware of hypoglycemia symptoms including shakiness. He has no concerns regarding his diabetes at this time.  He is not experiencing numbness or burning in feet, excessive thirst, blurry vision, weight changes or redness, sores or blisters on feet. The patient has had a diabetic eye exam in the last 12 months.         He eats 0-1 servings of fruits and vegetables daily.He consumes 3 sweetened beverage(s) daily.He exercises with enough effort to increase his heart rate 30 to 60 minutes per day.  He exercises with enough effort to increase his heart rate 7 days per week. He is missing 1 dose(s) of medications per week.     Patient presents today primarily for follow-up of type 2 diabetes mellitus.  In terms of his mental health, he does follow with a psychiatrist.  We had sent him to a psychologist for formal ADHD testing at the request of his psychiatrist.  He has an appointment to discuss treatment for ADHD tomorrow.  In terms of his diabetes, he does not bring any blood sugar readings for review.  He previously used a unrival system, does not have this now.  It sounds as though he has been checking a bit irregularly.  Per his recollection, he thinks his Premeal sugars range between 200 and 280 and his postmeal sugars are about 250.  These are better  than before, when his sugars were averaging in the 300s he states.  He does show me on his phone some blood sugars between the 80s and 140s when he was taking Trulicity.  It is unclear where these are in relation to meals.  I did provide him with some record books today so that he can note when his sugars occur in relation to meals.  Discussed checking Premeal or 2-hour postmeal sugars.  He had been checking 2.5 hours after meals.  He resumed glipizide since he has been out of Trulicity.  The pharmacy told him this needed a prior authorization.  He thinks he has seen an eye doctor within the past year and we will have him sign a release for this information today.  He has no other questions or concerns.  He never feels that he has symptomatic low blood sugars.    Labs Reviewed:  Lab Results   Component Value Date    WBC 10.7 07/22/2019    HGB 15.7 07/22/2019    HCT 45.3 07/22/2019     07/22/2019    CHOL 147 08/30/2021    TRIG 108 08/30/2021    HDL 37 (L) 08/30/2021    ALT 18 08/30/2021    AST 13 08/30/2021     08/30/2021    BUN 10 08/30/2021    CO2 25 08/30/2021    TSH 0.77 08/30/2021    MICROALBUR <0.50 11/16/2020       Review of Systems   Constitutional, HEENT, cardiovascular, pulmonary, gi and gu systems are negative, except as otherwise noted.      Objective    /73 (BP Location: Left arm, Patient Position: Sitting, Cuff Size: Adult Regular)   Pulse 80   Resp 16   Wt 126.2 kg (278 lb 3.2 oz)   BMI 35.01 kg/m    Body mass index is 35.01 kg/m .  Physical Exam   GENERAL: healthy, alert and no distress  EYES: Eyes grossly normal to inspection, PERRL and conjunctivae and sclerae normal  RESP: lungs clear to auscultation - no rales, rhonchi or wheezes  CV: regular rate and rhythm, normal S1 S2, no S3 or S4, no murmur, click or rub, no peripheral edema and peripheral pulses strong  MS: no gross musculoskeletal defects noted, no edema  SKIN: no suspicious lesions or rashes  NEURO: Normal strength  and tone, mentation intact and speech normal  PSYCH: mentation appears normal, affect normal/bright    Results for orders placed or performed in visit on 05/12/22 (from the past 24 hour(s))   Hemoglobin A1c   Result Value Ref Range    Hemoglobin A1C 9.3 (H) 0.0 - 5.6 %     Other labs pending at time of note.

## 2022-05-12 NOTE — TELEPHONE ENCOUNTER
----- Message from Miranda Cochran MD sent at 5/12/2022  8:20 AM CDT -----  Patient needs to initiate a prior authorization for Trulicity -- I'm not aware of the current PA pool, please forward, thanks!

## 2022-05-12 NOTE — TELEPHONE ENCOUNTER
Forwarding to PA team for review please see message below from PCP.    Sandip Marcelino RN     [facility]

## 2022-05-13 LAB — HCV AB SERPL QL IA: NONREACTIVE

## 2022-05-13 ASSESSMENT — ANXIETY QUESTIONNAIRES: GAD7 TOTAL SCORE: 5

## 2022-05-13 ASSESSMENT — PATIENT HEALTH QUESTIONNAIRE - PHQ9: SUM OF ALL RESPONSES TO PHQ QUESTIONS 1-9: 1

## 2022-05-17 ENCOUNTER — TELEPHONE (OUTPATIENT)
Dept: PSYCHOLOGY | Facility: CLINIC | Age: 32
End: 2022-05-17
Payer: COMMERCIAL

## 2022-05-17 NOTE — TELEPHONE ENCOUNTER
Reason for call:  Other   Patient called regarding (reason for call): Pt called and asked if his testing results could be  faxed over to his psychiatry provider in [facility]. He also would like a copy of the results if possible emailed to him as well.   Additional comments: Pt stated that his provider's name is Laura Contrerasjordan. He did not have the fax number at the time of the call.    Phone number to reach patient:  Cell number on file:    Telephone Information:   Mobile [phone]       Best Time: Any     Can we leave a detailed message on this number?  YES    Travel screening: Not Applicable

## 2022-08-08 DIAGNOSIS — E11.65 TYPE 2 DIABETES MELLITUS WITH HYPERGLYCEMIA, WITHOUT LONG-TERM CURRENT USE OF INSULIN (H): ICD-10-CM

## 2022-08-09 RX ORDER — DULAGLUTIDE 1.5 MG/.5ML
INJECTION, SOLUTION SUBCUTANEOUS
Qty: 6 ML | Refills: 1 | Status: SHIPPED | OUTPATIENT
Start: 2022-08-09

## 2022-08-09 NOTE — TELEPHONE ENCOUNTER
"Last Written Prescription Date:  2/9/22  Last Fill Quantity: 6 ml,  # refills: 1   Last office visit provider:  5/12/22     Requested Prescriptions   Pending Prescriptions Disp Refills     TRULICITY 1.5 MG/0.5ML pen [Pharmacy Med Name: Trulicity 1.5 MG/0.5ML Subcutaneous Solution Pen-injector] 8 mL 0     Sig: INJECT 1.5MG SUBCUTANEOUSLY FOR EVERY 7 DAYS       GLP-1 Agonists Protocol Passed - 8/9/2022  7:42 AM        Passed - HgbA1C in past 3 or 6 months     If HgbA1C is 8 or greater, it needs to be on file within the past 3 months.  If less than 8, must be on file within the past 6 months.     Recent Labs   Lab Test 05/12/22  0727   A1C 9.3*             Passed - Medication is active on med list        Passed - Patient is age 18 or older        Passed - Normal serum creatinine on file in past 12 months     Recent Labs   Lab Test 08/30/21  1434   CR 0.78       Ok to refill medication if creatinine is low          Passed - Recent (6 mo) or future (30 days) visit within the authorizing provider's specialty     Patient had office visit in the last 6 months or has a visit in the next 30 days with authorizing provider.  See \"Patient Info\" tab in inbasket, or \"Choose Columns\" in Meds & Orders section of the refill encounter.                 Tyrone Lang RN 08/09/22 7:42 AM  "

## 2022-10-01 ENCOUNTER — HEALTH MAINTENANCE LETTER (OUTPATIENT)
Age: 32
End: 2022-10-01

## 2023-02-05 ENCOUNTER — HEALTH MAINTENANCE LETTER (OUTPATIENT)
Age: 33
End: 2023-02-05

## 2023-08-12 ENCOUNTER — HEALTH MAINTENANCE LETTER (OUTPATIENT)
Age: 33
End: 2023-08-12

## 2024-03-03 ENCOUNTER — HEALTH MAINTENANCE LETTER (OUTPATIENT)
Age: 34
End: 2024-03-03

## 2024-09-29 ENCOUNTER — HEALTH MAINTENANCE LETTER (OUTPATIENT)
Age: 34
End: 2024-09-29

## 2025-03-16 ENCOUNTER — HEALTH MAINTENANCE LETTER (OUTPATIENT)
Age: 35
End: 2025-03-16

## 2025-04-06 ENCOUNTER — HEALTH MAINTENANCE LETTER (OUTPATIENT)
Age: 35
End: 2025-04-06